# Patient Record
Sex: MALE | Race: WHITE | NOT HISPANIC OR LATINO | Employment: FULL TIME | URBAN - METROPOLITAN AREA
[De-identification: names, ages, dates, MRNs, and addresses within clinical notes are randomized per-mention and may not be internally consistent; named-entity substitution may affect disease eponyms.]

---

## 2023-02-25 PROBLEM — I71.20 THORACIC AORTIC ANEURYSM WITHOUT RUPTURE (CMS-HCC): Status: ACTIVE | Noted: 2023-02-25

## 2023-02-25 PROBLEM — R53.81 MALAISE AND FATIGUE: Status: ACTIVE | Noted: 2023-02-25

## 2023-02-25 PROBLEM — R53.83 MALAISE AND FATIGUE: Status: ACTIVE | Noted: 2023-02-25

## 2023-02-25 PROBLEM — B02.29 POSTHERPETIC NEURALGIA: Status: ACTIVE | Noted: 2023-02-25

## 2023-02-25 PROBLEM — T75.3XXA MOTION SICKNESS: Status: ACTIVE | Noted: 2023-02-25

## 2023-02-25 PROBLEM — R51.9 HEADACHE DISORDER: Status: ACTIVE | Noted: 2023-02-25

## 2023-02-25 PROBLEM — M54.2 NECK PAIN: Status: ACTIVE | Noted: 2023-02-25

## 2023-02-25 PROBLEM — Q23.1 BICUSPID AORTIC VALVE (HHS-HCC): Status: ACTIVE | Noted: 2023-02-25

## 2023-02-25 PROBLEM — M54.81 OCCIPITAL NEURALGIA OF LEFT SIDE: Status: ACTIVE | Noted: 2023-02-25

## 2023-02-25 PROBLEM — B02.9 SHINGLES RASH: Status: ACTIVE | Noted: 2023-02-25

## 2023-02-25 PROBLEM — F41.9 ANXIETY: Status: ACTIVE | Noted: 2023-02-25

## 2023-02-25 PROBLEM — Q23.81 BICUSPID AORTIC VALVE: Status: ACTIVE | Noted: 2023-02-25

## 2023-02-25 RX ORDER — GABAPENTIN 400 MG/1
3 CAPSULE ORAL 3 TIMES DAILY
COMMUNITY
Start: 2016-11-11 | End: 2023-12-11 | Stop reason: SDUPTHER

## 2023-02-25 RX ORDER — NALOXONE HYDROCHLORIDE 4 MG/.1ML
SPRAY NASAL
COMMUNITY
Start: 2020-03-11 | End: 2024-01-14 | Stop reason: WASHOUT

## 2023-02-25 RX ORDER — MORPHINE SULFATE 15 MG/1
1 TABLET, FILM COATED, EXTENDED RELEASE ORAL 2 TIMES DAILY PRN
COMMUNITY
Start: 2022-10-14 | End: 2024-03-21 | Stop reason: SDUPTHER

## 2023-03-23 ENCOUNTER — OFFICE VISIT (OUTPATIENT)
Dept: PRIMARY CARE | Facility: CLINIC | Age: 53
End: 2023-03-23
Payer: COMMERCIAL

## 2023-03-23 VITALS
DIASTOLIC BLOOD PRESSURE: 75 MMHG | HEIGHT: 75 IN | HEART RATE: 57 BPM | SYSTOLIC BLOOD PRESSURE: 115 MMHG | OXYGEN SATURATION: 96 % | BODY MASS INDEX: 25.24 KG/M2 | TEMPERATURE: 97.5 F | WEIGHT: 203 LBS

## 2023-03-23 DIAGNOSIS — B02.29 POSTHERPETIC NEURALGIA: ICD-10-CM

## 2023-03-23 DIAGNOSIS — Z00.00 WELLNESS EXAMINATION: ICD-10-CM

## 2023-03-23 DIAGNOSIS — Q23.1 BICUSPID AORTIC VALVE (HHS-HCC): ICD-10-CM

## 2023-03-23 DIAGNOSIS — M54.81 OCCIPITAL NEURALGIA OF LEFT SIDE: Primary | ICD-10-CM

## 2023-03-23 PROCEDURE — 99396 PREV VISIT EST AGE 40-64: CPT | Performed by: FAMILY MEDICINE

## 2023-03-23 ASSESSMENT — PATIENT HEALTH QUESTIONNAIRE - PHQ9
1. LITTLE INTEREST OR PLEASURE IN DOING THINGS: NOT AT ALL
SUM OF ALL RESPONSES TO PHQ9 QUESTIONS 1 AND 2: 0
2. FEELING DOWN, DEPRESSED OR HOPELESS: NOT AT ALL

## 2023-03-23 NOTE — PROGRESS NOTES
"Subjective   Patient ID: Sebastian Andrews is a 52 y.o. male who presents for Follow-up and Annual Exam (Talk about his meds cause other drKristine For pain meds is leaving. ).    1. Health Maintenance  Overall patient is doing well and presents with no major health concerns today.  Colon Cancer Screening: No family history, will order cologuard   Diet: \"pretty good\"  Exercise:  working on improving it, active on his feet.     2. Postherpetic neuralgia   Seeing Pain Management. Managed with medications at this time. Cuts pain in half.     3. Bicuspid Aortic Valve   Follows cardiology Dr. Conner, obtaining CT next Thursday. Not symptomatic at this time.                Review of Systems   All other systems reviewed and are negative.      Objective   /75   Pulse 57   Temp 36.4 °C (97.5 °F)   Ht 1.905 m (6' 3\")   Wt 92.1 kg (203 lb)   SpO2 96%   BMI 25.37 kg/m²     Physical Exam  Constitutional:       Appearance: Normal appearance. He is normal weight.   HENT:      Head: Normocephalic and atraumatic.      Right Ear: Tympanic membrane and ear canal normal.      Left Ear: Tympanic membrane and ear canal normal.      Mouth/Throat:      Mouth: Mucous membranes are moist.   Eyes:      Extraocular Movements: Extraocular movements intact.      Conjunctiva/sclera: Conjunctivae normal.   Cardiovascular:      Rate and Rhythm: Normal rate and regular rhythm.   Pulmonary:      Breath sounds: Normal breath sounds.   Abdominal:      General: Abdomen is flat. Bowel sounds are normal.      Palpations: Abdomen is soft.   Musculoskeletal:      Cervical back: Normal range of motion and neck supple.   Skin:     General: Skin is warm.   Neurological:      General: No focal deficit present.      Mental Status: He is alert and oriented to person, place, and time.   Psychiatric:         Mood and Affect: Mood normal.         Behavior: Behavior normal.         Assessment/Plan   Problem List Items Addressed This Visit          Nervous    " Occipital neuralgia of left side - Primary    Postherpetic neuralgia     Continue with pain management.             Circulatory    Bicuspid aortic valve     Following Cardiology.            Other    Wellness examination     Routine lab work placed.   Cologuard order placed.

## 2023-10-30 ENCOUNTER — PHARMACY VISIT (OUTPATIENT)
Dept: PHARMACY | Facility: CLINIC | Age: 53
End: 2023-10-30
Payer: COMMERCIAL

## 2023-10-30 PROCEDURE — RXMED WILLOW AMBULATORY MEDICATION CHARGE

## 2023-11-27 ENCOUNTER — PHARMACY VISIT (OUTPATIENT)
Dept: PHARMACY | Facility: CLINIC | Age: 53
End: 2023-11-27
Payer: COMMERCIAL

## 2023-11-27 PROCEDURE — RXMED WILLOW AMBULATORY MEDICATION CHARGE

## 2023-12-11 ENCOUNTER — OFFICE VISIT (OUTPATIENT)
Dept: PAIN MEDICINE | Facility: CLINIC | Age: 53
End: 2023-12-11
Payer: COMMERCIAL

## 2023-12-11 VITALS — SYSTOLIC BLOOD PRESSURE: 118 MMHG | DIASTOLIC BLOOD PRESSURE: 80 MMHG | HEART RATE: 69 BPM | RESPIRATION RATE: 18 BRPM

## 2023-12-11 DIAGNOSIS — B02.29 POSTHERPETIC NEURALGIA: ICD-10-CM

## 2023-12-11 DIAGNOSIS — M54.81 OCCIPITAL NEURALGIA OF LEFT SIDE: Primary | ICD-10-CM

## 2023-12-11 PROCEDURE — 1036F TOBACCO NON-USER: CPT | Performed by: NURSE PRACTITIONER

## 2023-12-11 PROCEDURE — 99214 OFFICE O/P EST MOD 30 MIN: CPT | Performed by: NURSE PRACTITIONER

## 2023-12-11 PROCEDURE — RXMED WILLOW AMBULATORY MEDICATION CHARGE

## 2023-12-11 RX ORDER — GABAPENTIN 400 MG/1
1200 CAPSULE ORAL 3 TIMES DAILY
Qty: 810 CAPSULE | Refills: 2 | Status: SHIPPED | OUTPATIENT
Start: 2023-12-11 | End: 2024-01-14 | Stop reason: WASHOUT

## 2023-12-11 RX ORDER — MORPHINE SULFATE 15 MG/1
15 TABLET, FILM COATED, EXTENDED RELEASE ORAL 2 TIMES DAILY
Qty: 60 TABLET | Refills: 0 | Status: SHIPPED | OUTPATIENT
Start: 2024-01-26 | End: 2024-03-14

## 2023-12-11 RX ORDER — MORPHINE SULFATE 15 MG/1
15 TABLET, FILM COATED, EXTENDED RELEASE ORAL 2 TIMES DAILY PRN
Qty: 60 TABLET | Refills: 0 | Status: SHIPPED | OUTPATIENT
Start: 2023-12-27 | End: 2024-03-21 | Stop reason: SDUPTHER

## 2023-12-11 RX ORDER — MORPHINE SULFATE 15 MG/1
15 TABLET, FILM COATED, EXTENDED RELEASE ORAL 2 TIMES DAILY
Qty: 60 TABLET | Refills: 0 | Status: SHIPPED | OUTPATIENT
Start: 2024-02-25 | End: 2024-02-23 | Stop reason: SDUPTHER

## 2023-12-11 ASSESSMENT — ENCOUNTER SYMPTOMS
ALLERGIC/IMMUNOLOGIC NEGATIVE: 1
NECK PAIN: 0
CONSTITUTIONAL NEGATIVE: 1
JOINT SWELLING: 0
DIZZINESS: 0
BACK PAIN: 0
HEMATOLOGIC/LYMPHATIC NEGATIVE: 1
MYALGIAS: 0
HEADACHES: 1
FACIAL ASYMMETRY: 0
GASTROINTESTINAL NEGATIVE: 1
NUMBNESS: 0
PSYCHIATRIC NEGATIVE: 1
EYES NEGATIVE: 1
ARTHRALGIAS: 0
NECK STIFFNESS: 0
ENDOCRINE NEGATIVE: 1
WEAKNESS: 0
RESPIRATORY NEGATIVE: 1
CARDIOVASCULAR NEGATIVE: 1

## 2023-12-11 ASSESSMENT — PAIN SCALES - GENERAL: PAINLEVEL: 5

## 2023-12-11 NOTE — PROGRESS NOTES
Assessment/Plan   Problem List Items Addressed This Visit             ICD-10-CM    Occipital neuralgia of left side - Primary M54.81    Relevant Medications    morphine CR (MS Contin) 15 mg 12 hr tablet (Start on 12/27/2023)    morphine CR (MS Contin) 15 mg 12 hr tablet (Start on 1/26/2024)    morphine CR (MS Contin) 15 mg 12 hr tablet (Start on 2/25/2024)    gabapentin (Neurontin) 400 mg capsule    Other Relevant Orders    Drug Screen, Urine With Reflex to Confirmation    Postherpetic neuralgia B02.29    Relevant Medications    morphine CR (MS Contin) 15 mg 12 hr tablet (Start on 12/27/2023)    morphine CR (MS Contin) 15 mg 12 hr tablet (Start on 1/26/2024)    morphine CR (MS Contin) 15 mg 12 hr tablet (Start on 2/25/2024)    gabapentin (Neurontin) 400 mg capsule    Other Relevant Orders    Drug Screen, Urine With Reflex to Confirmation     Complete your toxicology at a  facility prior to your next office visit.    Follow-up 12 weeks.      Patient ID: Sebastian Andrews is a 53 y.o. male who presents for Med Refill (Postherpetic Neuralgia/Left Eye Occipital Pain  ).  GINA Lino follows up for interval reevaluation of his chronic pain from post herpetic neuralgia that is in a C2, C3 and C4 distribution to the left temporal, left side of the head.      His pain is an intermittent sharp and stabbing pain. It feels like an ice pick stabbing into his left ear. He has increased pain, allodynia especially if he is getting his haircut, or if the wind is blowing or any kind of tactile communication to this area increases this pain. Pain is worse in the morning and it takes a couple of hours to get under control with gabapentin and morphine. Once this is under control he can function very well throughout the day with his full-time job as a . No change in pain since last office visit.        Morphine extended release 15 mg twice daily, gabapentin 3600 mg daily that helps reduce pain and improve function up to  50%. Average pain score is 5 out of 10 with medication. Has an average quality of family and social life with current condition and treatment. Has not been to the ED for pain since last office visit. Toxicology consistent January 3, 2023. Annual controlled substance agreement and opioid risk tool are completed and scanned into the chart April 5, 2023.     For continuity:   Given the patient's report of reduced pain and improved functional ability without adverse effects, it is reasonable to treat with narcotic medications. The terms of the opioid agreement as well as the potential risks and adverse effects of the patient's medication regimen were discussed in detail. This includes if applicable due to dosage of medication permission to discuss and coordinate care with other treatment providers relevant to the patients condition. The patient verbalized understanding.      Risks and side effects of chronic opioid therapy including but not limited to tolerance, dependence, constipation, hyperalgesia, cognitive side effects, addiction and possible death due to overuse and or misuse were discussed. I also discussed that such medications when co-administered with other sedative agents including but not limited to alcohol, benzodiazepines, sedative hypnotics and illegal drugs could pose life threatening consequences including death. I also explained the impact that the administration of such medication has on a patient with obstructive sleep apnea and continued recommendations for use of apnea devices if ordered are prescribed by other physicians. In order to effectively and safely treat the pain, I also emphasized the importance of compliance with the treatment plan, as well as compliance with the terms of the opioid agreement, which was reviewed in detail. I explained the importance of being responsible with the medications and to take these only as prescribed, never in excess and never for reasons other than pain  reduction. The patient was counseled on keeping the medications safe and locked away from children and other adults as well as disposal methods and options. The patient understood the risks and instructions.      I also discussed with the patient in detail that based on the clinical response to the opioid medications and improvements of activities of daily living, sleep, and work performance in light of compliance with the treatment plan we can continue this form of therapy for the above chronic pain. The goal and rationale used for current treatment with chronic opioid medication is to control the pain and alleviate disability induced by the chronic pain condition noted above after failures of other non-opioid and nonpharmacological modalities to treat the chronic pain and the symptoms associated with have failed. The patient understood the goals in terms of the above treatment plan and had no further questions prior to leaving the office today.      Of note, the above-mentioned diagnoses/conditions and expected fluctuating nature of pain, and pain characteristic changes may lead to prolonged functional impairment requiring frequent and multiple reassessments with continued high level medical decision making. As noted, medication and medication management may require opiate therapy in excess of a routine less than 30 day medication requirement. The patient may require daily opiate therapy necessitating month-long prescription medication as noted above in order to perform activities of daily living and achieve acceptable quality of life with respect to their chronic pain condition for the foreseeable future. We monitor our patient's carefully through drug monitoring, medication counts, urine drug testing specific to their medication as well as a myriad of other substances and with frequent follow-ups with interval reassement of the chronic pain condition, its pathophysiology and prognosis.      The level of clinical  decision making at this office visit is high due to high risks and complications including mortality and morbidity related to acute and chronic pain with respects to life, bodily function, and treatment. Risks and clinical decisions with respect to under treatment, failure to maintain adequate treatment, and/or overtreatment complications and outcomes were discussed with the patient with respect to their chronic pain conditions, interventional therapies, as well as the use of various medications including possible controlled/dangerous medications. The amount and complexity of reviewed data at this in subsequent office visits is high given patient's fluctuating clinical presentation, laboratory and radiographic reports, prescription monitoring program data, and medication history as well as other relevant data as noted above. Pertinent negatives and positives data was used in consideration for the above-mentioned high complexity.       Given the patient's total MED, general use of daily opiates, or other coadministered medications in various classes the patient was offered a prescription for Narcan. I instructed the patient that it is important that patient fill this medication in order to demonstrate understanding of the gravity of possible side effects including respiratory depression and risk of overdose of this opiate load or medication combination. As such patient will be required to bring Narcan prescription to follow-up appointments as part of compliance with continued opiate care.      With respect to opiate induced constipation I discussed multiple ways to combat this problem including staying hydrated and taking over-the-counter medications such as Dulcolax, Miralax and Senna. If these treatments are not effective we could consider such medications as Amitiza, Linzess and Movantik.      Disclaimer: This note was transcribed using an audio transcription device. As such, minor errors may be present with  regard to spelling, punctuation, and inadvertent word insertion. Please disregard such errors.    Review of Systems   Constitutional: Negative.    HENT: Negative.     Eyes: Negative.    Respiratory: Negative.     Cardiovascular: Negative.    Gastrointestinal: Negative.    Endocrine: Negative.    Genitourinary: Negative.    Musculoskeletal:  Negative for arthralgias, back pain, gait problem, joint swelling, myalgias, neck pain and neck stiffness.   Skin: Negative.    Allergic/Immunologic: Negative.    Neurological:  Positive for headaches. Negative for dizziness, facial asymmetry, weakness and numbness.   Hematological: Negative.    Psychiatric/Behavioral: Negative.         Physical Exam  Vitals and nursing note reviewed.   Constitutional:       Appearance: Normal appearance.   HENT:      Head: Normocephalic and atraumatic.      Right Ear: External ear normal.      Left Ear: External ear normal.      Nose: Nose normal.   Eyes:      Extraocular Movements: Extraocular movements intact.      Conjunctiva/sclera: Conjunctivae normal.      Pupils: Pupils are equal, round, and reactive to light.   Cardiovascular:      Rate and Rhythm: Normal rate and regular rhythm.      Pulses: Normal pulses.      Comments: Systolic murmur right upper sternal border 2-3 out of 6.  Pulmonary:      Effort: Pulmonary effort is normal.      Breath sounds: Normal breath sounds.   Abdominal:      General: Abdomen is flat. Bowel sounds are normal.      Palpations: Abdomen is soft.   Musculoskeletal:         General: Normal range of motion.      Cervical back: Normal range of motion.   Skin:     General: Skin is warm and dry.      Capillary Refill: Capillary refill takes 2 to 3 seconds.   Neurological:      Mental Status: He is alert and oriented to person, place, and time.      Cranial Nerves: No cranial nerve deficit.      Sensory: No sensory deficit.      Motor: No weakness.      Coordination: Coordination normal.      Gait: Gait normal.       Deep Tendon Reflexes: Reflexes normal.   Psychiatric:         Mood and Affect: Mood normal.         Behavior: Behavior normal.             History reviewed. No pertinent surgical history.      Current Outpatient Medications:     gabapentin (Neurontin) 400 mg capsule, TAKE 3 CAPSULES BY MOUTH THREE TIMES A DAY, Disp: 810 capsule, Rfl: 0    morphine CR (MS Contin) 15 mg 12 hr tablet, TAKE 1 TABLET BY MOUTH TWO TIMES A DAY AS NEEDED, Disp: 60 tablet, Rfl: 0    naloxone (Narcan) 4 mg/0.1 mL nasal spray, USE AS DIRECTED, Disp: 2 each, Rfl: 0    gabapentin (Neurontin) 400 mg capsule, Take 3 capsules (1,200 mg) by mouth 3 times a day., Disp: , Rfl:     morphine CR (MS Contin) 15 mg 12 hr tablet, Take 1 tablet (15 mg) by mouth 2 times a day as needed., Disp: , Rfl:     morphine CR (MS Contin) 15 mg 12 hr tablet, TAKE 1 TABLET BY MOUTH TWO TIMES A DAY AS NEEDED (Patient not taking: Reported on 12/11/2023), Disp: 60 tablet, Rfl: 0    morphine CR (MS Contin) 15 mg 12 hr tablet, TAKE 1 TABLET BY MOUTH TWO TIMES A DAY AS NEEDED (Patient not taking: Reported on 12/11/2023), Disp: 60 tablet, Rfl: 0    morphine CR (MS Contin) 15 mg 12 hr tablet, TAKE 1 TABLET TWICE DAILY PRN (Patient not taking: Reported on 12/11/2023), Disp: 60 tablet, Rfl: 0    morphine CR (MS Contin) 15 mg 12 hr tablet, TAKE 1 TABLET BY MOUTH TWO TIMES A DAY AS NEEDED (Patient not taking: Reported on 12/11/2023), Disp: 60 tablet, Rfl: 0    naloxone (Narcan) 4 mg/0.1 mL nasal spray, Administer into affected nostril(s). Use as directed, Disp: , Rfl:     Allergies   Allergen Reactions    Iodinated Contrast Media Other     nausea    Shellfish Containing Products Unknown    Shellfish Derived Unknown       MRI Cervical without Contrast 31Mar2017 06:21PM Jina Garber   [Apr 6, 2017 3:30PM Delvin Stephen]  AMA Intake Activity Log Entry by Delvin Stephen [email or web address]) on 4/6/2017 3:25 PM  Status Change: To Closed - Confirmed-Appt Past   [Mar 23, 2017 4:23PM Jcarlos  Jina ROJAS  Reason: Unspecified for MRI Cervical without Contrast      Test Name Result Flag Reference   MRI Cervical without Contrast (Report)       Interpreted by: ARELI RENTERIA  04/01/17 14:11  MRN: [id number]  Patient Name: GA LAGOS     STUDY:  MRI CERVICAL WO; 3/31/2017 6:21 pm     INDICATION:  Signs/Symptoms: neck pain. Left-sided neck pain beginning October 2016.     COMPARISON:  None.     ACCESSION NUMBER(S):  [id number]     ORDERING CLINICIAN:  JINA MARIN     TECHNIQUE:  Sagittal T1, T2, axial T1 and axial T2 weighted images were acquired  through the cervical spine.     FINDINGS:  Alignment: The vertebral alignment is within normal limits.     Vertebrae/Intervertebral Discs: The vertebral bodies demonstrate  expected height.The marrow signal is within normal limits. There is  subtle disc space narrowing and endplate spurring at C6/7.     Cord: Normal in caliber and signal.     C1-C2: The cervicomedullary junction appears unremarkable. There is  no central canal stenosis.     C2-C3: There is no posterior disc contour abnormality. There is no  significant central canal or neural foraminal stenosis.     C3-C4: There is no posterior disc contour abnormality. Mild  uncovertebral joint hypertrophy and degenerative facet changes are  identified, left greater than right. There is mild left-sided neural  foraminal stenosis.     C4-C5: There is no posterior disc contour abnormality. There is no  significant central canal or neural foraminal stenosis.     C5-C6: There is mild posterior disc bulging and endplate spurring  with minimal effacement of the ventral subarachnoid space. There is  no significant central canal stenosis. Mild facet and uncovertebral  joint hypertrophy are identified, right greater than left. The neural  foramina remain patent.     C6-C7: There is a central disc protrusion partially effacing the  ventral subarachnoid space. There is also minimal narrowing of the  central canal. Mild  uncovertebral joint hypertrophy is noted, right  greater than left. There is very mild right-sided neural foraminal  stenosis.     C7-T1: There is no posterior disc contour abnormality. There is no  significant central canal or neural foraminal stenosis.     The upper thoracic vertebrae and spinal canal are unremarkable.     The prevertebral and posterior paraspinous soft tissues are within  normal limits.     IMPRESSION:  Very mild degenerative changes of the cervical spine.  Transcribed by: [email or web address], User  Electronically signed by: [email or web address], User  04/01/17 14:11          Active Ambulatory Problems     Diagnosis Date Noted    Anxiety 02/25/2023    Bicuspid aortic valve 02/25/2023    Headache disorder 02/25/2023    Malaise and fatigue 02/25/2023    Motion sickness 02/25/2023    Neck pain 02/25/2023    Occipital neuralgia of left side 02/25/2023    Postherpetic neuralgia 02/25/2023    Shingles rash 02/25/2023    Thoracic aortic aneurysm without rupture (CMS/HCC) 02/25/2023    Wellness examination 03/23/2023     Resolved Ambulatory Problems     Diagnosis Date Noted    No Resolved Ambulatory Problems     Past Medical History:   Diagnosis Date    Encounter for other specified prophylactic measures 01/08/2018    Other pneumothorax     Personal history of other diseases of the circulatory system     Personal history of other diseases of the nervous system and sense organs     Personal history of other infectious and parasitic diseases

## 2023-12-14 ENCOUNTER — PHARMACY VISIT (OUTPATIENT)
Dept: PHARMACY | Facility: CLINIC | Age: 53
End: 2023-12-14
Payer: COMMERCIAL

## 2023-12-18 ENCOUNTER — APPOINTMENT (OUTPATIENT)
Dept: PAIN MEDICINE | Facility: CLINIC | Age: 53
End: 2023-12-18
Payer: COMMERCIAL

## 2023-12-28 ENCOUNTER — PHARMACY VISIT (OUTPATIENT)
Dept: PHARMACY | Facility: CLINIC | Age: 53
End: 2023-12-28
Payer: COMMERCIAL

## 2023-12-28 PROCEDURE — RXMED WILLOW AMBULATORY MEDICATION CHARGE

## 2024-01-12 ENCOUNTER — LAB (OUTPATIENT)
Dept: LAB | Facility: LAB | Age: 54
End: 2024-01-12
Payer: COMMERCIAL

## 2024-01-12 DIAGNOSIS — Z00.00 WELLNESS EXAMINATION: ICD-10-CM

## 2024-01-12 LAB
25(OH)D3 SERPL-MCNC: 24 NG/ML (ref 30–100)
ALBUMIN SERPL BCP-MCNC: 4.1 G/DL (ref 3.4–5)
ALP SERPL-CCNC: 73 U/L (ref 33–120)
ALT SERPL W P-5'-P-CCNC: 15 U/L (ref 10–52)
ANION GAP SERPL CALC-SCNC: 8 MMOL/L (ref 10–20)
AST SERPL W P-5'-P-CCNC: 12 U/L (ref 9–39)
BASOPHILS # BLD AUTO: 0.06 X10*3/UL (ref 0–0.1)
BASOPHILS NFR BLD AUTO: 1.1 %
BILIRUB SERPL-MCNC: 0.7 MG/DL (ref 0–1.2)
BUN SERPL-MCNC: 13 MG/DL (ref 6–23)
CALCIUM SERPL-MCNC: 9.1 MG/DL (ref 8.6–10.3)
CHLORIDE SERPL-SCNC: 106 MMOL/L (ref 98–107)
CHOLEST SERPL-MCNC: 226 MG/DL (ref 0–199)
CHOLESTEROL/HDL RATIO: 4
CO2 SERPL-SCNC: 30 MMOL/L (ref 21–32)
CREAT SERPL-MCNC: 0.92 MG/DL (ref 0.5–1.3)
EGFRCR SERPLBLD CKD-EPI 2021: >90 ML/MIN/1.73M*2
EOSINOPHIL # BLD AUTO: 0.26 X10*3/UL (ref 0–0.7)
EOSINOPHIL NFR BLD AUTO: 4.8 %
ERYTHROCYTE [DISTWIDTH] IN BLOOD BY AUTOMATED COUNT: 12.6 % (ref 11.5–14.5)
GLUCOSE SERPL-MCNC: 83 MG/DL (ref 74–99)
HCT VFR BLD AUTO: 48.5 % (ref 41–52)
HDLC SERPL-MCNC: 55.9 MG/DL
HGB BLD-MCNC: 15.6 G/DL (ref 13.5–17.5)
IMM GRANULOCYTES # BLD AUTO: 0.01 X10*3/UL (ref 0–0.7)
IMM GRANULOCYTES NFR BLD AUTO: 0.2 % (ref 0–0.9)
LDLC SERPL CALC-MCNC: 147 MG/DL
LYMPHOCYTES # BLD AUTO: 1.75 X10*3/UL (ref 1.2–4.8)
LYMPHOCYTES NFR BLD AUTO: 32.3 %
MCH RBC QN AUTO: 29.4 PG (ref 26–34)
MCHC RBC AUTO-ENTMCNC: 32.2 G/DL (ref 32–36)
MCV RBC AUTO: 91 FL (ref 80–100)
MONOCYTES # BLD AUTO: 0.35 X10*3/UL (ref 0.1–1)
MONOCYTES NFR BLD AUTO: 6.5 %
NEUTROPHILS # BLD AUTO: 2.98 X10*3/UL (ref 1.2–7.7)
NEUTROPHILS NFR BLD AUTO: 55.1 %
NON HDL CHOLESTEROL: 170 MG/DL (ref 0–149)
NRBC BLD-RTO: 0 /100 WBCS (ref 0–0)
PLATELET # BLD AUTO: 177 X10*3/UL (ref 150–450)
POTASSIUM SERPL-SCNC: 4.4 MMOL/L (ref 3.5–5.3)
PROT SERPL-MCNC: 6.6 G/DL (ref 6.4–8.2)
PSA SERPL-MCNC: 0.53 NG/ML
RBC # BLD AUTO: 5.31 X10*6/UL (ref 4.5–5.9)
SODIUM SERPL-SCNC: 140 MMOL/L (ref 136–145)
TRIGL SERPL-MCNC: 115 MG/DL (ref 0–149)
TSH SERPL-ACNC: 2.46 MIU/L (ref 0.44–3.98)
VIT B12 SERPL-MCNC: 198 PG/ML (ref 211–911)
VLDL: 23 MG/DL (ref 0–40)
WBC # BLD AUTO: 5.4 X10*3/UL (ref 4.4–11.3)

## 2024-01-12 PROCEDURE — 80053 COMPREHEN METABOLIC PANEL: CPT

## 2024-01-12 PROCEDURE — 36415 COLL VENOUS BLD VENIPUNCTURE: CPT

## 2024-01-12 PROCEDURE — 82607 VITAMIN B-12: CPT

## 2024-01-12 PROCEDURE — 83036 HEMOGLOBIN GLYCOSYLATED A1C: CPT

## 2024-01-12 PROCEDURE — 84153 ASSAY OF PSA TOTAL: CPT

## 2024-01-12 PROCEDURE — 80061 LIPID PANEL: CPT

## 2024-01-12 PROCEDURE — 85025 COMPLETE CBC W/AUTO DIFF WBC: CPT

## 2024-01-12 PROCEDURE — 84443 ASSAY THYROID STIM HORMONE: CPT

## 2024-01-12 PROCEDURE — 82306 VITAMIN D 25 HYDROXY: CPT

## 2024-01-13 LAB
EST. AVERAGE GLUCOSE BLD GHB EST-MCNC: 117 MG/DL
HBA1C MFR BLD: 5.7 %

## 2024-01-15 ENCOUNTER — OFFICE VISIT (OUTPATIENT)
Dept: PRIMARY CARE | Facility: CLINIC | Age: 54
End: 2024-01-15
Payer: COMMERCIAL

## 2024-01-15 VITALS
TEMPERATURE: 97.6 F | HEART RATE: 56 BPM | OXYGEN SATURATION: 96 % | BODY MASS INDEX: 23.62 KG/M2 | DIASTOLIC BLOOD PRESSURE: 68 MMHG | WEIGHT: 190 LBS | HEIGHT: 75 IN | SYSTOLIC BLOOD PRESSURE: 110 MMHG

## 2024-01-15 DIAGNOSIS — Q23.1 BICUSPID AORTIC VALVE (HHS-HCC): ICD-10-CM

## 2024-01-15 DIAGNOSIS — E53.8 VITAMIN B 12 DEFICIENCY: ICD-10-CM

## 2024-01-15 DIAGNOSIS — M54.81 OCCIPITAL NEURALGIA OF LEFT SIDE: ICD-10-CM

## 2024-01-15 DIAGNOSIS — Z23 ENCOUNTER FOR IMMUNIZATION: Primary | ICD-10-CM

## 2024-01-15 DIAGNOSIS — E55.9 VITAMIN D DEFICIENCY: ICD-10-CM

## 2024-01-15 DIAGNOSIS — Z00.00 WELL ADULT HEALTH CHECK: ICD-10-CM

## 2024-01-15 DIAGNOSIS — I71.21 ANEURYSM OF ASCENDING AORTA WITHOUT RUPTURE (CMS-HCC): ICD-10-CM

## 2024-01-15 DIAGNOSIS — B02.29 POSTHERPETIC NEURALGIA: ICD-10-CM

## 2024-01-15 PROCEDURE — 90471 IMMUNIZATION ADMIN: CPT | Performed by: FAMILY MEDICINE

## 2024-01-15 PROCEDURE — RXMED WILLOW AMBULATORY MEDICATION CHARGE

## 2024-01-15 PROCEDURE — 99396 PREV VISIT EST AGE 40-64: CPT | Performed by: FAMILY MEDICINE

## 2024-01-15 PROCEDURE — 90750 HZV VACC RECOMBINANT IM: CPT | Performed by: FAMILY MEDICINE

## 2024-01-15 PROCEDURE — 1036F TOBACCO NON-USER: CPT | Performed by: FAMILY MEDICINE

## 2024-01-15 PROCEDURE — 96372 THER/PROPH/DIAG INJ SC/IM: CPT | Performed by: FAMILY MEDICINE

## 2024-01-15 RX ORDER — ERGOCALCIFEROL 1.25 MG/1
1.25 CAPSULE ORAL
Qty: 12 CAPSULE | Refills: 1 | Status: SHIPPED | OUTPATIENT
Start: 2024-01-15 | End: 2024-07-13

## 2024-01-15 RX ORDER — CYANOCOBALAMIN 1000 UG/ML
1000 INJECTION, SOLUTION INTRAMUSCULAR; SUBCUTANEOUS ONCE
Status: COMPLETED | OUTPATIENT
Start: 2024-01-15 | End: 2024-01-15

## 2024-01-15 RX ADMIN — CYANOCOBALAMIN 1000 MCG: 1000 INJECTION, SOLUTION INTRAMUSCULAR; SUBCUTANEOUS at 09:10

## 2024-01-15 ASSESSMENT — PATIENT HEALTH QUESTIONNAIRE - PHQ9
SUM OF ALL RESPONSES TO PHQ9 QUESTIONS 1 AND 2: 0
1. LITTLE INTEREST OR PLEASURE IN DOING THINGS: NOT AT ALL
2. FEELING DOWN, DEPRESSED OR HOPELESS: NOT AT ALL

## 2024-01-15 NOTE — PROGRESS NOTES
"Subjective   Patient ID: Sebastian Andrews is a 53 y.o. male who presents for Well exam  Needs flu and shingles vaccination    Exercise- active  Diet Eating   Psa Normal  No urinary issues  Needs to do cologuard and no colon cancer in the family      Labs done January 13, 2024  Hemoglobin A1c 5.7  Vitamin D was 24 showing vitamin D deficiency  Vitamin B12 was low at 198  PSA was normal 0.53  TSH was 2.46  Glucose 83, electrolytes normal, BUN 13, creatinine 1.92, liver functions normal  Total cholesterol 226, HDL 55.9, , triglycerides 115  CBC normal    Review of Systems   All other systems reviewed and are negative.      Objective   /68 (BP Location: Right arm, Patient Position: Sitting, BP Cuff Size: Adult)   Pulse 56   Temp 36.4 °C (97.6 °F)   Ht 1.905 m (6' 3\")   Wt 86.2 kg (190 lb)   SpO2 96%   BMI 23.75 kg/m²     General: Patient is alert and oriented ×3 and appears in no acute distress. No respiratory distress.    Head: Atraumatic normocephalic.    Eyes: EOMI, PERRLA      Ears: Canals patent without any irritation, tympanic membranes without inflammation, no swelling, normal light reflex.    Nose: Nares patent. Turbinates are not swollen. No discharge.    Mouth: Normal mucosa. Moist. No erythema, exudates, tonsillar enlargement.    Neck: Normal range of motion, no masses.  Thyroid is palpable and normal in size without any nodules. No anterior cervical or posterior cervical adenopathy.    Heart: Regular rate and rhythm, no clicks or gallops, 3 out of 6 systolic murmur heard best at the right upper sternal border    Lungs: Clear to auscultation bilaterally without any rhonchi rales or wheezing, lung sounds heard throughout all lung fields    Abdomen: Soft, nontender, no rigidity, rebound, guarding or organomegaly. Bowel sounds ×4 quadrants.    Musculoskeletal: Normal range of motion, strength is grossly intact in the proximal distal muscles of the upper and lower extremities bilaterally, deep " tendon reflexes +2 out of 4 and symmetric bilaterally at the patella, Achilles, biceps, triceps, sensation intact.    Nerves: Cranial nerves II through XII appear grossly intact and without deficit    Skin: Intact, dry, no rashes or erythema    Psych: Normal affect.    Assessment/Plan   Problem List Items Addressed This Visit       Bicuspid aortic valve    Occipital neuralgia of left side    Postherpetic neuralgia    Thoracic aortic aneurysm without rupture (CMS/HCC)     Other Visit Diagnoses       Encounter for immunization    -  Primary    Relevant Orders    Zoster vaccine, recombinant, adult (SHINGRIX)    Well adult health check        Vitamin B 12 deficiency        Relevant Medications    cyanocobalamin (Vitamin B-12) injection 1,000 mcg (Start on 1/15/2024  9:15 AM)    Vitamin D deficiency            Postherpetic neuralgia  Left-sided head.  Follow with pain management.  On MS Contin and gabapentin.  - Given Shingrix vaccination today January 15, 2024.  Follow-up in 2 to 6 months for second vaccination    Bicuspid aortic valve and enlarging ascending aorta  - Following with Dr. Conner's cardiology  - Patient did have hyperlipidemia and stated to discuss with Dr. Conner starting statin.    Prostate cancer screening was done January 2024.  Patient has no symptoms.  PSA was normal 0.53    Colon cancer screening needs to be done and patient has Cologuard that he will do at home.  There is no family history of colon cancer.    Patient has vitamin B-12 deficiency  - Started vitamin B12 sublingual  - Given vitamin B12 injection in the office today    Vitamin D deficiency  - Started vitamin D 50,000 IUs weekly with food

## 2024-01-17 ENCOUNTER — PHARMACY VISIT (OUTPATIENT)
Dept: PHARMACY | Facility: CLINIC | Age: 54
End: 2024-01-17
Payer: COMMERCIAL

## 2024-01-26 ENCOUNTER — PHARMACY VISIT (OUTPATIENT)
Dept: PHARMACY | Facility: CLINIC | Age: 54
End: 2024-01-26
Payer: COMMERCIAL

## 2024-01-26 PROCEDURE — RXMED WILLOW AMBULATORY MEDICATION CHARGE

## 2024-02-23 ENCOUNTER — PHARMACY VISIT (OUTPATIENT)
Dept: PHARMACY | Facility: CLINIC | Age: 54
End: 2024-02-23
Payer: COMMERCIAL

## 2024-02-23 DIAGNOSIS — B02.29 POSTHERPETIC NEURALGIA: ICD-10-CM

## 2024-02-23 DIAGNOSIS — M54.81 OCCIPITAL NEURALGIA OF LEFT SIDE: ICD-10-CM

## 2024-02-23 PROCEDURE — RXMED WILLOW AMBULATORY MEDICATION CHARGE

## 2024-02-23 RX ORDER — MORPHINE SULFATE 15 MG/1
15 TABLET, FILM COATED, EXTENDED RELEASE ORAL 2 TIMES DAILY
Qty: 60 TABLET | Refills: 0 | Status: SHIPPED | OUTPATIENT
Start: 2024-02-23 | End: 2024-03-21 | Stop reason: SDUPTHER

## 2024-03-14 ENCOUNTER — OFFICE VISIT (OUTPATIENT)
Dept: CARDIOLOGY | Facility: HOSPITAL | Age: 54
End: 2024-03-14
Payer: COMMERCIAL

## 2024-03-14 VITALS
WEIGHT: 202.82 LBS | SYSTOLIC BLOOD PRESSURE: 120 MMHG | DIASTOLIC BLOOD PRESSURE: 84 MMHG | BODY MASS INDEX: 25.35 KG/M2 | HEART RATE: 64 BPM | OXYGEN SATURATION: 98 %

## 2024-03-14 DIAGNOSIS — Q23.1 BICUSPID AORTIC VALVE (HHS-HCC): Primary | ICD-10-CM

## 2024-03-14 DIAGNOSIS — Q23.1: ICD-10-CM

## 2024-03-14 DIAGNOSIS — E78.5 DYSLIPIDEMIA: ICD-10-CM

## 2024-03-14 PROCEDURE — 93005 ELECTROCARDIOGRAM TRACING: CPT | Performed by: INTERNAL MEDICINE

## 2024-03-14 PROCEDURE — 1036F TOBACCO NON-USER: CPT | Performed by: INTERNAL MEDICINE

## 2024-03-14 PROCEDURE — 99214 OFFICE O/P EST MOD 30 MIN: CPT | Mod: 25 | Performed by: INTERNAL MEDICINE

## 2024-03-14 PROCEDURE — 93010 ELECTROCARDIOGRAM REPORT: CPT | Performed by: INTERNAL MEDICINE

## 2024-03-14 PROCEDURE — 99214 OFFICE O/P EST MOD 30 MIN: CPT | Performed by: INTERNAL MEDICINE

## 2024-03-14 RX ORDER — LANOLIN ALCOHOL/MO/W.PET/CERES
1000 CREAM (GRAM) TOPICAL DAILY
COMMUNITY

## 2024-03-14 NOTE — PROGRESS NOTES
Chief Complaint:   Follow-up     History Of Present Illness:    Sebastian Andrews is a 53 y.o. male presenting for follow-up.  Doing well from a cardiac standpoint.  Denies any chest pain, shortness of breath, dizziness, palpitations.     Last Recorded Vitals:  Vitals:    03/14/24 0919   BP: 120/84   Pulse: 64   SpO2: 98%   Weight: 92 kg (202 lb 13.2 oz)       Past Medical History:  He has a past medical history of Encounter for other specified prophylactic measures (01/08/2018), Other pneumothorax, Personal history of other diseases of the circulatory system, Personal history of other diseases of the nervous system and sense organs, and Personal history of other infectious and parasitic diseases.    Past Surgical History:  He has no past surgical history on file.      Social History:  He reports that he has never smoked. He has never used smokeless tobacco. He reports that he does not currently use alcohol. He reports that he does not use drugs.    Family History:  Family History   Problem Relation Name Age of Onset    Diabetes Mother      Other (cardiovascular disease) Mother      Other (cardiovascular disease) Father      Coronary artery disease Maternal Grandmother      Emphysema Other          Allergies:  Patient has no known allergies.    Outpatient Medications:  Current Outpatient Medications   Medication Instructions    cyanocobalamin (VITAMIN B-12) 1,000 mcg, oral, Daily    gabapentin (Neurontin) 400 mg capsule TAKE 3 CAPSULES BY MOUTH THREE TIMES A DAY    morphine CR (MS Contin) 15 mg 12 hr tablet 1 tablet, oral, 2 times daily PRN    morphine CR (MS Contin) 15 mg 12 hr tablet Take 1 tablet (15 mg) by mouth 2 times a day as needed for severe pain (7 - 10). Do not start before December 27, 2023.    morphine CR (MS Contin) 15 mg 12 hr tablet Take 1 tablet (15 mg) by mouth 2 times a day. Do not crush, chew, or split. Do not start before January 26, 2024.    morphine CR (MS CONTIN) 15 mg, oral, 2 times daily, Do  not crush, chew, or split.    naloxone (Narcan) 4 mg/0.1 mL nasal spray USE AS DIRECTED    Vitamin D2 1,250 mcg, oral, Weekly       Physical Exam:  Constitutional:       Appearance: Healthy appearance. Not in distress.   Neck:      Vascular: No JVR. JVD normal.   Pulmonary:      Effort: Pulmonary effort is normal.      Breath sounds: Normal breath sounds. No wheezing. No rhonchi. No rales.   Chest:      Chest wall: Not tender to palpatation.   Cardiovascular:      Normal rate. Regular rhythm.      Murmurs: There is no murmur.   Edema:     Peripheral edema absent.   Abdominal:      General: Bowel sounds are normal.      Palpations: Abdomen is soft.      Tenderness: There is no abdominal tenderness.   Musculoskeletal: Normal range of motion. Skin:     General: Skin is warm and dry.   Neurological:      General: No focal deficit present.      Mental Status: Alert and oriented to person, place and time.           Last Labs:  CBC -  Lab Results   Component Value Date    WBC 5.4 01/12/2024    HGB 15.6 01/12/2024    HCT 48.5 01/12/2024    MCV 91 01/12/2024     01/12/2024       CMP -  Lab Results   Component Value Date    CALCIUM 9.1 01/12/2024    PROT 6.6 01/12/2024    ALBUMIN 4.1 01/12/2024    AST 12 01/12/2024    ALT 15 01/12/2024    ALKPHOS 73 01/12/2024    BILITOT 0.7 01/12/2024       LIPID PANEL -   Lab Results   Component Value Date    CHOL 226 (H) 01/12/2024    TRIG 115 01/12/2024    HDL 55.9 01/12/2024    CHHDL 4.0 01/12/2024    LDLF 138 (H) 03/03/2020    VLDL 23 01/12/2024    NHDL 170 (H) 01/12/2024       RENAL FUNCTION PANEL -   Lab Results   Component Value Date    GLUCOSE 83 01/12/2024     01/12/2024    K 4.4 01/12/2024     01/12/2024    CO2 30 01/12/2024    ANIONGAP 8 (L) 01/12/2024    BUN 13 01/12/2024    CREATININE 0.92 01/12/2024    CALCIUM 9.1 01/12/2024    ALBUMIN 4.1 01/12/2024        Lab Results   Component Value Date    HGBA1C 5.7 (H) 01/12/2024       Last Cardiology Tests:    ECG  independently reviewed from today: Sinus rhythm, rate 61, no ST or T wave abnormalities    CTA 3/2023:  Annulus  3.2 x 2.4 cm  Root (Sinus of Valsalva)  4.2 x 2.7 cm  Sinotubular junction 3.7 cm  Mid ascending  4.7 x 4.7 cm  Mid descending 2.3 cm      Echo 3/12/2020:  1. The left ventricular systolic function is mildly decreased with a 50%  estimated ejection fraction.  2. Spectral Doppler shows an impaired relaxation pattern of left ventricular  diastolic filling.  3. There is a bicuspid aortic valve. Mild aortic valve stenosis.  4. There is mild dilatation of the ascending aorta (4.2cm).        Assessment/Plan   Very pleasant 53-year-old gentleman with bicuspid aortic valve and moderately dilated aortic root--mid ascending aorta 4.7 cm on CTA in 2023.  Limited bedside echo today shows 4.7 cm dilatation on echo.     Plan:   -CAC Scoring for cardiac risk assessment with borderline lipids and we will be able to assess aortic aneurysmal size as well--Will consider statin therapy if CAC score warrants as well as will determine further surveillance of bicuspid aortic valve and aortopathy pending results           Josep Conner MD

## 2024-03-18 ENCOUNTER — LAB (OUTPATIENT)
Dept: LAB | Facility: LAB | Age: 54
End: 2024-03-18
Payer: COMMERCIAL

## 2024-03-18 DIAGNOSIS — B02.29 POSTHERPETIC NEURALGIA: ICD-10-CM

## 2024-03-18 DIAGNOSIS — M54.81 OCCIPITAL NEURALGIA OF LEFT SIDE: ICD-10-CM

## 2024-03-18 LAB
AMPHETAMINES UR QL SCN: ABNORMAL
BARBITURATES UR QL SCN: ABNORMAL
BENZODIAZ UR QL SCN: ABNORMAL
BZE UR QL SCN: ABNORMAL
CANNABINOIDS UR QL SCN: ABNORMAL
FENTANYL+NORFENTANYL UR QL SCN: ABNORMAL
METHADONE UR QL SCN: ABNORMAL
OPIATES UR QL SCN: ABNORMAL
OXYCODONE+OXYMORPHONE UR QL SCN: ABNORMAL
PCP UR QL SCN: ABNORMAL

## 2024-03-18 PROCEDURE — 80307 DRUG TEST PRSMV CHEM ANLYZR: CPT

## 2024-03-18 PROCEDURE — 80361 OPIATES 1 OR MORE: CPT

## 2024-03-18 PROCEDURE — 80365 DRUG SCREENING OXYCODONE: CPT

## 2024-03-21 ENCOUNTER — OFFICE VISIT (OUTPATIENT)
Dept: PAIN MEDICINE | Facility: CLINIC | Age: 54
End: 2024-03-21
Payer: COMMERCIAL

## 2024-03-21 VITALS
HEART RATE: 58 BPM | DIASTOLIC BLOOD PRESSURE: 77 MMHG | OXYGEN SATURATION: 98 % | RESPIRATION RATE: 17 BRPM | SYSTOLIC BLOOD PRESSURE: 116 MMHG

## 2024-03-21 DIAGNOSIS — M54.81 OCCIPITAL NEURALGIA OF LEFT SIDE: ICD-10-CM

## 2024-03-21 DIAGNOSIS — B02.29 POSTHERPETIC NEURALGIA: Primary | ICD-10-CM

## 2024-03-21 LAB
6MAM UR CFM-MCNC: <25 NG/ML
CODEINE UR CFM-MCNC: <50 NG/ML
HYDROCODONE CTO UR CFM-MCNC: <25 NG/ML
HYDROMORPHONE UR CFM-MCNC: 170 NG/ML
MORPHINE UR CFM-MCNC: >2500 NG/ML
NORHYDROCODONE UR CFM-MCNC: <25 NG/ML
NOROXYCODONE UR CFM-MCNC: <25 NG/ML
OXYCODONE UR CFM-MCNC: <25 NG/ML
OXYMORPHONE UR CFM-MCNC: <25 NG/ML

## 2024-03-21 PROCEDURE — 1036F TOBACCO NON-USER: CPT | Performed by: NURSE PRACTITIONER

## 2024-03-21 PROCEDURE — RXMED WILLOW AMBULATORY MEDICATION CHARGE

## 2024-03-21 PROCEDURE — 99213 OFFICE O/P EST LOW 20 MIN: CPT | Performed by: NURSE PRACTITIONER

## 2024-03-21 RX ORDER — MORPHINE SULFATE 15 MG/1
15 TABLET, FILM COATED, EXTENDED RELEASE ORAL 2 TIMES DAILY
Qty: 60 TABLET | Refills: 0 | Status: SHIPPED | OUTPATIENT
Start: 2024-03-24 | End: 2024-04-24

## 2024-03-21 RX ORDER — GABAPENTIN 400 MG/1
1200 CAPSULE ORAL 3 TIMES DAILY
Qty: 810 CAPSULE | Refills: 1 | Status: SHIPPED | OUTPATIENT
Start: 2024-03-21 | End: 2024-09-17

## 2024-03-21 RX ORDER — MORPHINE SULFATE 15 MG/1
15 TABLET, FILM COATED, EXTENDED RELEASE ORAL 2 TIMES DAILY PRN
Qty: 60 TABLET | Refills: 0 | Status: SHIPPED | OUTPATIENT
Start: 2024-04-23 | End: 2024-05-23

## 2024-03-21 RX ORDER — MORPHINE SULFATE 15 MG/1
15 TABLET, FILM COATED, EXTENDED RELEASE ORAL 2 TIMES DAILY
Qty: 60 TABLET | Refills: 0 | Status: SHIPPED | OUTPATIENT
Start: 2024-05-23 | End: 2024-06-22

## 2024-03-21 ASSESSMENT — ENCOUNTER SYMPTOMS
PAIN: 1
ENDOCRINE NEGATIVE: 1
CONSTITUTIONAL NEGATIVE: 1
NECK PAIN: 0
BACK PAIN: 0
MYALGIAS: 0
FACIAL ASYMMETRY: 0
EYES NEGATIVE: 1
HEMATOLOGIC/LYMPHATIC NEGATIVE: 1
NECK STIFFNESS: 0
CARDIOVASCULAR NEGATIVE: 1
NUMBNESS: 0
GASTROINTESTINAL NEGATIVE: 1
ALLERGIC/IMMUNOLOGIC NEGATIVE: 1
DIZZINESS: 0
JOINT SWELLING: 0
HEADACHES: 1
PSYCHIATRIC NEGATIVE: 1
RESPIRATORY NEGATIVE: 1
ARTHRALGIAS: 0
WEAKNESS: 0

## 2024-03-21 ASSESSMENT — PAIN SCALES - GENERAL: PAINLEVEL: 5

## 2024-03-21 NOTE — PROGRESS NOTES
Assessment/Plan   Problem List Items Addressed This Visit             ICD-10-CM    Occipital neuralgia of left side M54.81    Postherpetic neuralgia B02.29       Follow-up 12 weeks.      Patient ID: Sebastian Andrews is a 53 y.o. male who presents for Med Refill and Pain (Postherpetic Neuralgia/Head/Eye ).  Med Refill  Associated symptoms include headaches. Pertinent negatives include no arthralgias, joint swelling, myalgias, neck pain, numbness or weakness.   Pain  Associated symptoms include headaches. Pertinent negatives include no joint swelling or weakness.       [facility]land follows up for interval reevaluation of his chronic pain from post herpetic neuralgia that is in a C2, C3 and C4 distribution to the left temporal, left side of the head.      His pain is an intermittent sharp and stabbing pain. It feels like an ice pick stabbing into his left ear. He has increased pain, allodynia especially if he is getting his haircut, or if the wind is blowing or any kind of tactile communication to this area increases this pain. Pain is worse in the morning and it takes a couple of hours to get under control with gabapentin and morphine. Once this is under control he can function very well throughout the day with his full-time job as a . No change in pain since last office visit.        Morphine extended release 15 mg twice daily, gabapentin 3600 mg daily that helps reduce pain and improve function up to 50%. Average pain score is 5 out of 10 with medication. Has an average quality of family and social life with current condition and treatment. Has not been to the ED for pain since last office visit. Toxicology consistent March 18, 2024.  Annual controlled substance agreement and opioid risk tool are completed and scanned into the chart March 21, 2024.     For continuity:   Given the patient's report of reduced pain and improved functional ability without adverse effects, it is reasonable to treat with narcotic  medications. The terms of the opioid agreement as well as the potential risks and adverse effects of the patient's medication regimen were discussed in detail. This includes if applicable due to dosage of medication permission to discuss and coordinate care with other treatment providers relevant to the patients condition. The patient verbalized understanding.      Risks and side effects of chronic opioid therapy including but not limited to tolerance, dependence, constipation, hyperalgesia, cognitive side effects, addiction and possible death due to overuse and or misuse were discussed. I also discussed that such medications when co-administered with other sedative agents including but not limited to alcohol, benzodiazepines, sedative hypnotics and illegal drugs could pose life threatening consequences including death. I also explained the impact that the administration of such medication has on a patient with obstructive sleep apnea and continued recommendations for use of apnea devices if ordered are prescribed by other physicians. In order to effectively and safely treat the pain, I also emphasized the importance of compliance with the treatment plan, as well as compliance with the terms of the opioid agreement, which was reviewed in detail. I explained the importance of being responsible with the medications and to take these only as prescribed, never in excess and never for reasons other than pain reduction. The patient was counseled on keeping the medications safe and locked away from children and other adults as well as disposal methods and options. The patient understood the risks and instructions.      I also discussed with the patient in detail that based on the clinical response to the opioid medications and improvements of activities of daily living, sleep, and work performance in light of compliance with the treatment plan we can continue this form of therapy for the above chronic pain. The goal and  rationale used for current treatment with chronic opioid medication is to control the pain and alleviate disability induced by the chronic pain condition noted above after failures of other non-opioid and nonpharmacological modalities to treat the chronic pain and the symptoms associated with have failed. The patient understood the goals in terms of the above treatment plan and had no further questions prior to leaving the office today.      Of note, the above-mentioned diagnoses/conditions and expected fluctuating nature of pain, and pain characteristic changes may lead to prolonged functional impairment requiring frequent and multiple reassessments with continued high level medical decision making. As noted, medication and medication management may require opiate therapy in excess of a routine less than 30 day medication requirement. The patient may require daily opiate therapy necessitating month-long prescription medication as noted above in order to perform activities of daily living and achieve acceptable quality of life with respect to their chronic pain condition for the foreseeable future. We monitor our patient's carefully through drug monitoring, medication counts, urine drug testing specific to their medication as well as a myriad of other substances and with frequent follow-ups with interval reassement of the chronic pain condition, its pathophysiology and prognosis.      The level of clinical decision making at this office visit is high due to high risks and complications including mortality and morbidity related to acute and chronic pain with respects to life, bodily function, and treatment. Risks and clinical decisions with respect to under treatment, failure to maintain adequate treatment, and/or overtreatment complications and outcomes were discussed with the patient with respect to their chronic pain conditions, interventional therapies, as well as the use of various medications including possible  controlled/dangerous medications. The amount and complexity of reviewed data at this in subsequent office visits is high given patient's fluctuating clinical presentation, laboratory and radiographic reports, prescription monitoring program data, and medication history as well as other relevant data as noted above. Pertinent negatives and positives data was used in consideration for the above-mentioned high complexity.       Given the patient's total MED, general use of daily opiates, or other coadministered medications in various classes the patient was offered a prescription for Narcan. I instructed the patient that it is important that patient fill this medication in order to demonstrate understanding of the gravity of possible side effects including respiratory depression and risk of overdose of this opiate load or medication combination. As such patient will be required to bring Narcan prescription to follow-up appointments as part of compliance with continued opiate care.      With respect to opiate induced constipation I discussed multiple ways to combat this problem including staying hydrated and taking over-the-counter medications such as Dulcolax, Miralax and Senna. If these treatments are not effective we could consider such medications as Amitiza, Linzess and Movantik.      Disclaimer: This note was transcribed using an audio transcription device. As such, minor errors may be present with regard to spelling, punctuation, and inadvertent word insertion. Please disregard such errors.    Review of Systems   Constitutional: Negative.    HENT: Negative.     Eyes: Negative.    Respiratory: Negative.     Cardiovascular: Negative.    Gastrointestinal: Negative.    Endocrine: Negative.    Genitourinary: Negative.    Musculoskeletal:  Negative for arthralgias, back pain, gait problem, joint swelling, myalgias, neck pain and neck stiffness.   Skin: Negative.    Allergic/Immunologic: Negative.    Neurological:   Positive for headaches. Negative for dizziness, facial asymmetry, weakness and numbness.   Hematological: Negative.    Psychiatric/Behavioral: Negative.         Physical Exam  Vitals and nursing note reviewed.   Constitutional:       Appearance: Normal appearance.   HENT:      Head: Normocephalic and atraumatic.      Right Ear: External ear normal.      Left Ear: External ear normal.      Nose: Nose normal.   Eyes:      Extraocular Movements: Extraocular movements intact.      Conjunctiva/sclera: Conjunctivae normal.      Pupils: Pupils are equal, round, and reactive to light.   Cardiovascular:      Rate and Rhythm: Normal rate and regular rhythm.      Pulses: Normal pulses.      Comments: Systolic murmur right upper sternal border 2-3 out of 6.  Pulmonary:      Effort: Pulmonary effort is normal.      Breath sounds: Normal breath sounds.   Abdominal:      General: Abdomen is flat. Bowel sounds are normal.      Palpations: Abdomen is soft.   Musculoskeletal:         General: Normal range of motion.      Cervical back: Normal range of motion.   Skin:     General: Skin is warm and dry.      Capillary Refill: Capillary refill takes 2 to 3 seconds.   Neurological:      Mental Status: He is alert and oriented to person, place, and time.      Cranial Nerves: No cranial nerve deficit.      Sensory: No sensory deficit.      Motor: No weakness.      Coordination: Coordination normal.      Gait: Gait normal.      Deep Tendon Reflexes: Reflexes normal.   Psychiatric:         Mood and Affect: Mood normal.         Behavior: Behavior normal.           No past surgical history on file.      Current Outpatient Medications:     cyanocobalamin (Vitamin B-12) 1,000 mcg tablet, Take 1 tablet (1,000 mcg) by mouth once daily., Disp: , Rfl:     ergocalciferol (Vitamin D-2) 1.25 MG (45833 UT) capsule, Take 1 capsule (1,250 mcg) by mouth 1 (one) time per week., Disp: 12 capsule, Rfl: 1    gabapentin (Neurontin) 400 mg capsule, TAKE 3  CAPSULES BY MOUTH THREE TIMES A DAY, Disp: 810 capsule, Rfl: 0    morphine CR (MS Contin) 15 mg 12 hr tablet, Take 1 tablet (15 mg) by mouth 2 times a day as needed., Disp: , Rfl:     morphine CR (MS Contin) 15 mg 12 hr tablet, Take 1 tablet (15 mg) by mouth 2 times a day as needed for severe pain (7 - 10). Do not start before December 27, 2023., Disp: 60 tablet, Rfl: 0    morphine CR (MS Contin) 15 mg 12 hr tablet, Take 1 tablet (15 mg) by mouth 2 times a day. Do not crush, chew, or split. Do not start before January 26, 2024., Disp: 60 tablet, Rfl: 0    morphine CR (MS Contin) 15 mg 12 hr tablet, Take 1 tablet (15 mg) by mouth 2 times a day. Do not crush, chew, or split., Disp: 60 tablet, Rfl: 0    naloxone (Narcan) 4 mg/0.1 mL nasal spray, USE AS DIRECTED, Disp: 2 each, Rfl: 0    No Known Allergies      MRI Cervical without Contrast 31Mar2017 06:21PM Jina Garber   [Apr 6, 2017 3:30PM Delvin Stephen]  [facility] Intake Activity Log Entry by Delvin Stephne ([email or web address]) on 4/6/2017 3:25 PM  Status Change: To Closed - Confirmed-Appt Past   [Mar 23, 2017 4:23PM Jina Garber]  Reason: Unspecified for MRI Cervical without Contrast      Test Name Result Flag Reference   MRI Cervical without Contrast (Report)       Interpreted by: ARELI RENTERIA  04/01/17 14:11  MRN: [id number]  Patient Name: GA LAGOS     STUDY:  MRI CERVICAL WO; 3/31/2017 6:21 pm     INDICATION:  Signs/Symptoms: neck pain. Left-sided neck pain beginning October 2016.     COMPARISON:  None.     ACCESSION NUMBER(S):  [id number]     ORDERING CLINICIAN:  JINA GARBER     TECHNIQUE:  Sagittal T1, T2, axial T1 and axial T2 weighted images were acquired  through the cervical spine.     FINDINGS:  Alignment: The vertebral alignment is within normal limits.     Vertebrae/Intervertebral Discs: The vertebral bodies demonstrate  expected height.The marrow signal is within normal limits. There is  subtle disc space narrowing and endplate spurring at  C6/7.     Cord: Normal in caliber and signal.     C1-C2: The cervicomedullary junction appears unremarkable. There is  no central canal stenosis.     C2-C3: There is no posterior disc contour abnormality. There is no  significant central canal or neural foraminal stenosis.     C3-C4: There is no posterior disc contour abnormality. Mild  uncovertebral joint hypertrophy and degenerative facet changes are  identified, left greater than right. There is mild left-sided neural  foraminal stenosis.     C4-C5: There is no posterior disc contour abnormality. There is no  significant central canal or neural foraminal stenosis.     C5-C6: There is mild posterior disc bulging and endplate spurring  with minimal effacement of the ventral subarachnoid space. There is  no significant central canal stenosis. Mild facet and uncovertebral  joint hypertrophy are identified, right greater than left. The neural  foramina remain patent.     C6-C7: There is a central disc protrusion partially effacing the  ventral subarachnoid space. There is also minimal narrowing of the  central canal. Mild uncovertebral joint hypertrophy is noted, right  greater than left. There is very mild right-sided neural foraminal  stenosis.     C7-T1: There is no posterior disc contour abnormality. There is no  significant central canal or neural foraminal stenosis.     The upper thoracic vertebrae and spinal canal are unremarkable.     The prevertebral and posterior paraspinous soft tissues are within  normal limits.     IMPRESSION:  Very mild degenerative changes of the cervical spine.  Transcribed by: [email or web address], User  Electronically signed by: [email or web address], User  04/01/17 14:11          Active Ambulatory Problems     Diagnosis Date Noted    Anxiety 02/25/2023    Bicuspid aortic valve 02/25/2023    Headache disorder 02/25/2023    Malaise and fatigue 02/25/2023    Motion sickness 02/25/2023    Neck pain 02/25/2023    Occipital neuralgia of left side  02/25/2023    Postherpetic neuralgia 02/25/2023    Shingles rash 02/25/2023    Thoracic aortic aneurysm without rupture (CMS/HCC) 02/25/2023    Wellness examination 03/23/2023     Resolved Ambulatory Problems     Diagnosis Date Noted    No Resolved Ambulatory Problems     Past Medical History:   Diagnosis Date    Encounter for other specified prophylactic measures 01/08/2018    Other pneumothorax     Personal history of other diseases of the circulatory system     Personal history of other diseases of the nervous system and sense organs     Personal history of other infectious and parasitic diseases

## 2024-03-22 ENCOUNTER — PHARMACY VISIT (OUTPATIENT)
Dept: PHARMACY | Facility: CLINIC | Age: 54
End: 2024-03-22
Payer: COMMERCIAL

## 2024-03-25 ENCOUNTER — PHARMACY VISIT (OUTPATIENT)
Dept: PHARMACY | Facility: CLINIC | Age: 54
End: 2024-03-25
Payer: COMMERCIAL

## 2024-03-25 PROCEDURE — RXMED WILLOW AMBULATORY MEDICATION CHARGE

## 2024-04-13 LAB
ATRIAL RATE: 61 BPM
P AXIS: 56 DEGREES
P OFFSET: 205 MS
P ONSET: 136 MS
PR INTERVAL: 160 MS
Q ONSET: 216 MS
QRS COUNT: 10 BEATS
QRS DURATION: 94 MS
QT INTERVAL: 432 MS
QTC CALCULATION(BAZETT): 434 MS
QTC FREDERICIA: 434 MS
R AXIS: 57 DEGREES
T AXIS: 45 DEGREES
T OFFSET: 432 MS
VENTRICULAR RATE: 61 BPM

## 2024-04-23 ENCOUNTER — PHARMACY VISIT (OUTPATIENT)
Dept: PHARMACY | Facility: CLINIC | Age: 54
End: 2024-04-23
Payer: COMMERCIAL

## 2024-04-23 PROCEDURE — RXMED WILLOW AMBULATORY MEDICATION CHARGE

## 2024-05-23 ENCOUNTER — PHARMACY VISIT (OUTPATIENT)
Dept: PHARMACY | Facility: CLINIC | Age: 54
End: 2024-05-23
Payer: COMMERCIAL

## 2024-05-23 PROCEDURE — RXMED WILLOW AMBULATORY MEDICATION CHARGE

## 2024-05-29 ENCOUNTER — HOSPITAL ENCOUNTER (OUTPATIENT)
Dept: RADIOLOGY | Facility: HOSPITAL | Age: 54
Discharge: HOME | End: 2024-05-29
Payer: COMMERCIAL

## 2024-05-29 DIAGNOSIS — Q23.1 BICUSPID AORTIC VALVE (HHS-HCC): ICD-10-CM

## 2024-05-29 DIAGNOSIS — E78.5 DYSLIPIDEMIA: ICD-10-CM

## 2024-05-29 PROCEDURE — 75571 CT HRT W/O DYE W/CA TEST: CPT

## 2024-06-20 ENCOUNTER — APPOINTMENT (OUTPATIENT)
Dept: PAIN MEDICINE | Facility: CLINIC | Age: 54
End: 2024-06-20
Payer: COMMERCIAL

## 2024-06-20 VITALS — OXYGEN SATURATION: 97 % | HEART RATE: 80 BPM | DIASTOLIC BLOOD PRESSURE: 69 MMHG | SYSTOLIC BLOOD PRESSURE: 115 MMHG

## 2024-06-20 DIAGNOSIS — M54.81 OCCIPITAL NEURALGIA OF LEFT SIDE: Primary | ICD-10-CM

## 2024-06-20 DIAGNOSIS — B02.29 POSTHERPETIC NEURALGIA: ICD-10-CM

## 2024-06-20 PROCEDURE — 99213 OFFICE O/P EST LOW 20 MIN: CPT | Performed by: NURSE PRACTITIONER

## 2024-06-20 PROCEDURE — RXMED WILLOW AMBULATORY MEDICATION CHARGE

## 2024-06-20 PROCEDURE — 1036F TOBACCO NON-USER: CPT | Performed by: NURSE PRACTITIONER

## 2024-06-20 RX ORDER — GABAPENTIN 400 MG/1
1200 CAPSULE ORAL 3 TIMES DAILY
Qty: 810 CAPSULE | Refills: 3 | Status: SHIPPED | OUTPATIENT
Start: 2024-06-20 | End: 2025-06-20

## 2024-06-20 RX ORDER — MORPHINE SULFATE 15 MG/1
15 TABLET, FILM COATED, EXTENDED RELEASE ORAL 2 TIMES DAILY
Qty: 60 TABLET | Refills: 0 | Status: SHIPPED | OUTPATIENT
Start: 2024-08-21 | End: 2024-09-20

## 2024-06-20 RX ORDER — MORPHINE SULFATE 15 MG/1
15 TABLET, FILM COATED, EXTENDED RELEASE ORAL 2 TIMES DAILY
Qty: 60 TABLET | Refills: 0 | Status: SHIPPED | OUTPATIENT
Start: 2024-06-21 | End: 2024-07-21

## 2024-06-20 RX ORDER — MORPHINE SULFATE 15 MG/1
15 TABLET, FILM COATED, EXTENDED RELEASE ORAL 2 TIMES DAILY
Qty: 60 TABLET | Refills: 0 | Status: SHIPPED | OUTPATIENT
Start: 2024-07-22 | End: 2024-08-21

## 2024-06-20 ASSESSMENT — PAIN - FUNCTIONAL ASSESSMENT: PAIN_FUNCTIONAL_ASSESSMENT: 0-10

## 2024-06-20 ASSESSMENT — ENCOUNTER SYMPTOMS
NUMBNESS: 0
PAIN: 1
NECK PAIN: 0
ALLERGIC/IMMUNOLOGIC NEGATIVE: 1
NECK STIFFNESS: 0
GASTROINTESTINAL NEGATIVE: 1
HEADACHES: 1
BACK PAIN: 0
RESPIRATORY NEGATIVE: 1
FACIAL ASYMMETRY: 0
EYES NEGATIVE: 1
MYALGIAS: 0
HEMATOLOGIC/LYMPHATIC NEGATIVE: 1
JOINT SWELLING: 0
WEAKNESS: 0
CONSTITUTIONAL NEGATIVE: 1
ENDOCRINE NEGATIVE: 1
CARDIOVASCULAR NEGATIVE: 1
PSYCHIATRIC NEGATIVE: 1
ARTHRALGIAS: 0
DIZZINESS: 0

## 2024-06-20 ASSESSMENT — PAIN DESCRIPTION - DESCRIPTORS: DESCRIPTORS: SHARP

## 2024-06-20 ASSESSMENT — PAIN SCALES - GENERAL: PAINLEVEL_OUTOF10: 5 - MODERATE PAIN

## 2024-06-20 NOTE — PROGRESS NOTES
Patient ID: Sebastian Andrews is a 54 y.o. male who presents for post herpatic neuralgia.  Med Refill  Associated symptoms include headaches. Pertinent negatives include no arthralgias, joint swelling, myalgias, neck pain, numbness or weakness.   Pain  Associated symptoms include headaches. Pertinent negatives include no joint swelling or weakness.       Holland follows up for interval reevaluation of his chronic pain from post herpetic neuralgia that is in a C2, C3 and C4 distribution to the left temporal, left side of the head.      His pain is an intermittent sharp and stabbing pain. It feels like an ice pick stabbing into his left ear. He has increased pain, allodynia especially if he is getting his haircut, or if the wind is blowing or any kind of tactile communication to this area increases this pain. Pain is worse in the morning and it takes a couple of hours to get under control with gabapentin and morphine. Once this is under control he can function very well throughout the day with his full-time job as a . No change in pain since last office visit.        Morphine extended release 15 mg twice daily, gabapentin 3600 mg daily that helps reduce pain and improve function up to 50%. Average pain score is 5 out of 10 with medication. Has an average quality of family and social life with current condition and treatment. Has not been to the ED for pain since last office visit. Toxicology consistent March 18, 2024.  Annual controlled substance agreement and opioid risk tool are completed and scanned into the chart March 21, 2024.     For continuity:   Given the patient's report of reduced pain and improved functional ability without adverse effects, it is reasonable to treat with narcotic medications. The terms of the opioid agreement as well as the potential risks and adverse effects of the patient's medication regimen were discussed in detail. This includes if applicable due to dosage of medication  permission to discuss and coordinate care with other treatment providers relevant to the patients condition. The patient verbalized understanding.      Risks and side effects of chronic opioid therapy including but not limited to tolerance, dependence, constipation, hyperalgesia, cognitive side effects, addiction and possible death due to overuse and or misuse were discussed. I also discussed that such medications when co-administered with other sedative agents including but not limited to alcohol, benzodiazepines, sedative hypnotics and illegal drugs could pose life threatening consequences including death. I also explained the impact that the administration of such medication has on a patient with obstructive sleep apnea and continued recommendations for use of apnea devices if ordered are prescribed by other physicians. In order to effectively and safely treat the pain, I also emphasized the importance of compliance with the treatment plan, as well as compliance with the terms of the opioid agreement, which was reviewed in detail. I explained the importance of being responsible with the medications and to take these only as prescribed, never in excess and never for reasons other than pain reduction. The patient was counseled on keeping the medications safe and locked away from children and other adults as well as disposal methods and options. The patient understood the risks and instructions.      I also discussed with the patient in detail that based on the clinical response to the opioid medications and improvements of activities of daily living, sleep, and work performance in light of compliance with the treatment plan we can continue this form of therapy for the above chronic pain. The goal and rationale used for current treatment with chronic opioid medication is to control the pain and alleviate disability induced by the chronic pain condition noted above after failures of other non-opioid and  nonpharmacological modalities to treat the chronic pain and the symptoms associated with have failed. The patient understood the goals in terms of the above treatment plan and had no further questions prior to leaving the office today.      Of note, the above-mentioned diagnoses/conditions and expected fluctuating nature of pain, and pain characteristic changes may lead to prolonged functional impairment requiring frequent and multiple reassessments with continued high level medical decision making. As noted, medication and medication management may require opiate therapy in excess of a routine less than 30 day medication requirement. The patient may require daily opiate therapy necessitating month-long prescription medication as noted above in order to perform activities of daily living and achieve acceptable quality of life with respect to their chronic pain condition for the foreseeable future. We monitor our patient's carefully through drug monitoring, medication counts, urine drug testing specific to their medication as well as a myriad of other substances and with frequent follow-ups with interval reassement of the chronic pain condition, its pathophysiology and prognosis.      The level of clinical decision making at this office visit is high due to high risks and complications including mortality and morbidity related to acute and chronic pain with respects to life, bodily function, and treatment. Risks and clinical decisions with respect to under treatment, failure to maintain adequate treatment, and/or overtreatment complications and outcomes were discussed with the patient with respect to their chronic pain conditions, interventional therapies, as well as the use of various medications including possible controlled/dangerous medications. The amount and complexity of reviewed data at this in subsequent office visits is high given patient's fluctuating clinical presentation, laboratory and radiographic  reports, prescription monitoring program data, and medication history as well as other relevant data as noted above. Pertinent negatives and positives data was used in consideration for the above-mentioned high complexity.       Given the patient's total MED, general use of daily opiates, or other coadministered medications in various classes the patient was offered a prescription for Narcan. I instructed the patient that it is important that patient fill this medication in order to demonstrate understanding of the gravity of possible side effects including respiratory depression and risk of overdose of this opiate load or medication combination. As such patient will be required to bring Narcan prescription to follow-up appointments as part of compliance with continued opiate care.      With respect to opiate induced constipation I discussed multiple ways to combat this problem including staying hydrated and taking over-the-counter medications such as Dulcolax, Miralax and Senna. If these treatments are not effective we could consider such medications as Amitiza, Linzess and Movantik.      Disclaimer: This note was transcribed using an audio transcription device. As such, minor errors may be present with regard to spelling, punctuation, and inadvertent word insertion. Please disregard such errors.    Review of Systems   Constitutional: Negative.    HENT: Negative.     Eyes: Negative.    Respiratory: Negative.     Cardiovascular: Negative.    Gastrointestinal: Negative.    Endocrine: Negative.    Genitourinary: Negative.    Musculoskeletal:  Negative for arthralgias, back pain, gait problem, joint swelling, myalgias, neck pain and neck stiffness.   Skin: Negative.    Allergic/Immunologic: Negative.    Neurological:  Positive for headaches. Negative for dizziness, facial asymmetry, weakness and numbness.   Hematological: Negative.    Psychiatric/Behavioral: Negative.         Physical Exam  Vitals and nursing note  reviewed.   Constitutional:       Appearance: Normal appearance.   HENT:      Head: Normocephalic and atraumatic.      Right Ear: External ear normal.      Left Ear: External ear normal.      Nose: Nose normal.   Eyes:      Extraocular Movements: Extraocular movements intact.      Conjunctiva/sclera: Conjunctivae normal.      Pupils: Pupils are equal, round, and reactive to light.   Cardiovascular:      Rate and Rhythm: Normal rate and regular rhythm.      Pulses: Normal pulses.      Comments: Systolic murmur right upper sternal border 2-3 out of 6.  Pulmonary:      Effort: Pulmonary effort is normal.      Breath sounds: Normal breath sounds.   Abdominal:      General: Abdomen is flat. Bowel sounds are normal.      Palpations: Abdomen is soft.   Musculoskeletal:         General: Normal range of motion.      Cervical back: Normal range of motion.   Skin:     General: Skin is warm and dry.      Capillary Refill: Capillary refill takes 2 to 3 seconds.   Neurological:      Mental Status: He is alert and oriented to person, place, and time.      Cranial Nerves: No cranial nerve deficit.      Sensory: No sensory deficit.      Motor: No weakness.      Coordination: Coordination normal.      Gait: Gait normal.      Deep Tendon Reflexes: Reflexes normal.   Psychiatric:         Mood and Affect: Mood normal.         Behavior: Behavior normal.           History reviewed. No pertinent surgical history.      Current Outpatient Medications:     cyanocobalamin (Vitamin B-12) 1,000 mcg tablet, Take 1 tablet (1,000 mcg) by mouth once daily., Disp: , Rfl:     ergocalciferol (Vitamin D-2) 1.25 MG (06780 UT) capsule, Take 1 capsule (1,250 mcg) by mouth 1 (one) time per week., Disp: 12 capsule, Rfl: 1    gabapentin (Neurontin) 400 mg capsule, TAKE 3 CAPSULES BY MOUTH THREE TIMES A DAY, Disp: 810 capsule, Rfl: 1    morphine CR (MS Contin) 15 mg 12 hr tablet, Take 1 tablet (15 mg) by mouth 2 times a day. Do not start before May 23, 2024.,  Disp: 60 tablet, Rfl: 0    naloxone (Narcan) 4 mg/0.1 mL nasal spray, USE AS DIRECTED, Disp: 2 each, Rfl: 0    morphine CR (MS Contin) 15 mg 12 hr tablet, Take 1 tablet (15 mg) by mouth 2 times a day. Do not crush, chew, or split. Do not start before January 26, 2024., Disp: 60 tablet, Rfl: 0    morphine CR (MS Contin) 15 mg 12 hr tablet, Take 1 tablet (15 mg) by mouth 2 times a day. Do not crush, chew, or split. Do not start before March 24, 2024., Disp: 60 tablet, Rfl: 0    morphine CR (MS Contin) 15 mg 12 hr tablet, Take 1 tablet (15 mg) by mouth 2 times a day as needed for severe pain (7 - 10). Do not start before April 23, 2024., Disp: 60 tablet, Rfl: 0    No Known Allergies      MRI Cervical without Contrast 31Mar2017 06:21PM Jina Garber   [Apr 6, 2017 3:30PM Delvin Stephen]  [facility] Intake Activity Log Entry by Delvin Stephen ([email or web address]) on 4/6/2017 3:25 PM  Status Change: To Closed - Confirmed-Appt Past   [Mar 23, 2017 4:23PM Jina Garber]  Reason: Unspecified for MRI Cervical without Contrast      Test Name Result Flag Reference   MRI Cervical without Contrast (Report)       Interpreted by: ARELI RENTERIA  04/01/17 14:11  MRN: [id number]  Patient Name: GA LAGOS     STUDY:  MRI CERVICAL WO; 3/31/2017 6:21 pm     INDICATION:  Signs/Symptoms: neck pain. Left-sided neck pain beginning October 2016.     COMPARISON:  None.     ACCESSION NUMBER(S):  [id number]     ORDERING CLINICIAN:  JINA GARBER     TECHNIQUE:  Sagittal T1, T2, axial T1 and axial T2 weighted images were acquired  through the cervical spine.     FINDINGS:  Alignment: The vertebral alignment is within normal limits.     Vertebrae/Intervertebral Discs: The vertebral bodies demonstrate  expected height.The marrow signal is within normal limits. There is  subtle disc space narrowing and endplate spurring at C6/7.     Cord: Normal in caliber and signal.     C1-C2: The cervicomedullary junction appears unremarkable. There is  no central  canal stenosis.     C2-C3: There is no posterior disc contour abnormality. There is no  significant central canal or neural foraminal stenosis.     C3-C4: There is no posterior disc contour abnormality. Mild  uncovertebral joint hypertrophy and degenerative facet changes are  identified, left greater than right. There is mild left-sided neural  foraminal stenosis.     C4-C5: There is no posterior disc contour abnormality. There is no  significant central canal or neural foraminal stenosis.     C5-C6: There is mild posterior disc bulging and endplate spurring  with minimal effacement of the ventral subarachnoid space. There is  no significant central canal stenosis. Mild facet and uncovertebral  joint hypertrophy are identified, right greater than left. The neural  foramina remain patent.     C6-C7: There is a central disc protrusion partially effacing the  ventral subarachnoid space. There is also minimal narrowing of the  central canal. Mild uncovertebral joint hypertrophy is noted, right  greater than left. There is very mild right-sided neural foraminal  stenosis.     C7-T1: There is no posterior disc contour abnormality. There is no  significant central canal or neural foraminal stenosis.     The upper thoracic vertebrae and spinal canal are unremarkable.     The prevertebral and posterior paraspinous soft tissues are within  normal limits.     IMPRESSION:  Very mild degenerative changes of the cervical spine.  Transcribed by: [email or web address], User  Electronically signed by: [email or web address], User  04/01/17 14:11          Active Ambulatory Problems     Diagnosis Date Noted    Anxiety 02/25/2023    Bicuspid aortic valve ([facility]) 02/25/2023    Headache disorder 02/25/2023    Malaise and fatigue 02/25/2023    Motion sickness 02/25/2023    Neck pain 02/25/2023    Occipital neuralgia of left side 02/25/2023    Postherpetic neuralgia 02/25/2023    Shingles rash 02/25/2023    Thoracic aortic aneurysm without rupture  ([facility]) 02/25/2023    Wellness examination 03/23/2023     Resolved Ambulatory Problems     Diagnosis Date Noted    No Resolved Ambulatory Problems     Past Medical History:   Diagnosis Date    Encounter for other specified prophylactic measures 01/08/2018    Other pneumothorax     Personal history of other diseases of the circulatory system     Personal history of other diseases of the nervous system and sense organs     Personal history of other infectious and parasitic diseases       Sebastian was seen today for post herpatic neuralgia.  Diagnoses and all orders for this visit:  Occipital neuralgia of left side (Primary)  -     morphine CR (MS Contin) 15 mg 12 hr tablet; Take 1 tablet (15 mg) by mouth 2 times a day. Ok to fill one day early due to pharm closed on weekends Do not fill before June 21, 2024.  -     morphine CR (MS Contin) 15 mg 12 hr tablet; Take 1 tablet (15 mg) by mouth 2 times a day. Do not fill before July 22, 2024.  -     morphine CR (MS Contin) 15 mg 12 hr tablet; Take 1 tablet (15 mg) by mouth 2 times a day. Do not fill before August 21, 2024.  -     gabapentin (Neurontin) 400 mg capsule; Take 3 capsules (1,200 mg) by mouth 3 times a day.  Postherpetic neuralgia  -     morphine CR (MS Contin) 15 mg 12 hr tablet; Take 1 tablet (15 mg) by mouth 2 times a day. Ok to fill one day early due to pharm closed on weekends Do not fill before June 21, 2024.  -     morphine CR (MS Contin) 15 mg 12 hr tablet; Take 1 tablet (15 mg) by mouth 2 times a day. Do not fill before July 22, 2024.  -     morphine CR (MS Contin) 15 mg 12 hr tablet; Take 1 tablet (15 mg) by mouth 2 times a day. Do not fill before August 21, 2024.  -     gabapentin (Neurontin) 400 mg capsule; Take 3 capsules (1,200 mg) by mouth 3 times a day.     Follow-up 12 weeks.

## 2024-06-21 ENCOUNTER — PHARMACY VISIT (OUTPATIENT)
Dept: PHARMACY | Facility: CLINIC | Age: 54
End: 2024-06-21
Payer: COMMERCIAL

## 2024-06-21 PROCEDURE — RXMED WILLOW AMBULATORY MEDICATION CHARGE

## 2024-07-15 ENCOUNTER — APPOINTMENT (OUTPATIENT)
Dept: PRIMARY CARE | Facility: CLINIC | Age: 54
End: 2024-07-15
Payer: COMMERCIAL

## 2024-07-15 DIAGNOSIS — Z23 ENCOUNTER FOR IMMUNIZATION: ICD-10-CM

## 2024-07-15 PROCEDURE — 90750 HZV VACC RECOMBINANT IM: CPT | Performed by: FAMILY MEDICINE

## 2024-07-15 PROCEDURE — 90471 IMMUNIZATION ADMIN: CPT | Performed by: FAMILY MEDICINE

## 2024-07-22 ENCOUNTER — PHARMACY VISIT (OUTPATIENT)
Dept: PHARMACY | Facility: CLINIC | Age: 54
End: 2024-07-22
Payer: COMMERCIAL

## 2024-07-22 PROCEDURE — RXMED WILLOW AMBULATORY MEDICATION CHARGE

## 2024-08-21 ENCOUNTER — PHARMACY VISIT (OUTPATIENT)
Dept: PHARMACY | Facility: CLINIC | Age: 54
End: 2024-08-21
Payer: COMMERCIAL

## 2024-08-21 PROCEDURE — RXMED WILLOW AMBULATORY MEDICATION CHARGE

## 2024-09-16 ENCOUNTER — OFFICE VISIT (OUTPATIENT)
Dept: PAIN MEDICINE | Facility: CLINIC | Age: 54
End: 2024-09-16
Payer: COMMERCIAL

## 2024-09-16 VITALS
HEART RATE: 71 BPM | DIASTOLIC BLOOD PRESSURE: 77 MMHG | RESPIRATION RATE: 18 BRPM | OXYGEN SATURATION: 97 % | SYSTOLIC BLOOD PRESSURE: 120 MMHG

## 2024-09-16 DIAGNOSIS — B02.29 POSTHERPETIC NEURALGIA: ICD-10-CM

## 2024-09-16 DIAGNOSIS — M54.81 OCCIPITAL NEURALGIA OF LEFT SIDE: ICD-10-CM

## 2024-09-16 DIAGNOSIS — Z79.891 ENCOUNTER FOR LONG-TERM USE OF OPIATE ANALGESIC: ICD-10-CM

## 2024-09-16 PROCEDURE — 99213 OFFICE O/P EST LOW 20 MIN: CPT | Performed by: NURSE PRACTITIONER

## 2024-09-16 PROCEDURE — 1036F TOBACCO NON-USER: CPT | Performed by: NURSE PRACTITIONER

## 2024-09-16 RX ORDER — NALOXONE HYDROCHLORIDE 4 MG/.1ML
1 SPRAY NASAL AS NEEDED
Qty: 2 EACH | Refills: 0 | Status: SHIPPED | OUTPATIENT
Start: 2024-09-16

## 2024-09-16 RX ORDER — MORPHINE SULFATE 15 MG/1
15 TABLET, FILM COATED, EXTENDED RELEASE ORAL 2 TIMES DAILY
Qty: 60 TABLET | Refills: 0 | Status: SHIPPED | OUTPATIENT
Start: 2024-09-20 | End: 2024-10-20

## 2024-09-16 RX ORDER — MORPHINE SULFATE 15 MG/1
15 TABLET, FILM COATED, EXTENDED RELEASE ORAL 2 TIMES DAILY
Qty: 60 TABLET | Refills: 0 | Status: SHIPPED | OUTPATIENT
Start: 2024-11-19 | End: 2024-12-19

## 2024-09-16 RX ORDER — MORPHINE SULFATE 15 MG/1
15 TABLET, FILM COATED, EXTENDED RELEASE ORAL 2 TIMES DAILY
Qty: 60 TABLET | Refills: 0 | Status: SHIPPED | OUTPATIENT
Start: 2024-10-20 | End: 2024-11-19

## 2024-09-16 ASSESSMENT — ENCOUNTER SYMPTOMS
EYES NEGATIVE: 1
HEADACHES: 1
NECK STIFFNESS: 0
CONSTITUTIONAL NEGATIVE: 1
NUMBNESS: 0
NECK PAIN: 0
FACIAL ASYMMETRY: 0
JOINT SWELLING: 0
HEMATOLOGIC/LYMPHATIC NEGATIVE: 1
DIZZINESS: 0
ALLERGIC/IMMUNOLOGIC NEGATIVE: 1
CARDIOVASCULAR NEGATIVE: 1
WEAKNESS: 0
MYALGIAS: 0
ARTHRALGIAS: 0
RESPIRATORY NEGATIVE: 1
BACK PAIN: 0
PAIN: 1
GASTROINTESTINAL NEGATIVE: 1
ENDOCRINE NEGATIVE: 1
PSYCHIATRIC NEGATIVE: 1

## 2024-09-16 ASSESSMENT — PAIN SCALES - GENERAL: PAINLEVEL: 5

## 2024-09-16 NOTE — PROGRESS NOTES
Patient ID: Sebastian Andrews is a 54 y.o. male who presents for Med Refill and Pain (Post Hepatic Neuralgia ).  Med Refill  Associated symptoms include headaches. Pertinent negatives include no arthralgias, joint swelling, myalgias, neck pain, numbness or weakness.   Pain  Associated symptoms include headaches. Pertinent negatives include no joint swelling or weakness.       Holland follows up for interval reevaluation of his chronic pain from post herpetic neuralgia that is in a C2, C3 and C4 distribution to the left temporal, left side of the head.      His pain is an intermittent sharp and stabbing pain. It feels like an ice pick stabbing into his left ear. He has increased pain, allodynia especially if he is getting his haircut, or if the wind is blowing or any kind of tactile communication to this area increases this pain. Pain is worse in the morning and it takes a couple of hours to get under control with gabapentin and morphine. Once this is under control he can function very well throughout the day with his full-time job as a . No change in pain since last office visit.        Morphine extended release 15 mg twice daily, gabapentin 3600 mg daily that helps reduce pain and improve function up to 50%. Average pain score is 5 out of 10 with medication. Has an average quality of family and social life with current condition and treatment. Has not been to the ED for pain since last office visit. Toxicology consistent March 18, 2024.  Annual controlled substance agreement and opioid risk tool are completed and scanned into the chart March 21, 2024.     For continuity:   Given the patient's report of reduced pain and improved functional ability without adverse effects, it is reasonable to treat with narcotic medications. The terms of the opioid agreement as well as the potential risks and adverse effects of the patient's medication regimen were discussed in detail. This includes if applicable due to dosage  of medication permission to discuss and coordinate care with other treatment providers relevant to the patients condition. The patient verbalized understanding.      Risks and side effects of chronic opioid therapy including but not limited to tolerance, dependence, constipation, hyperalgesia, cognitive side effects, addiction and possible death due to overuse and or misuse were discussed. I also discussed that such medications when co-administered with other sedative agents including but not limited to alcohol, benzodiazepines, sedative hypnotics and illegal drugs could pose life threatening consequences including death. I also explained the impact that the administration of such medication has on a patient with obstructive sleep apnea and continued recommendations for use of apnea devices if ordered are prescribed by other physicians. In order to effectively and safely treat the pain, I also emphasized the importance of compliance with the treatment plan, as well as compliance with the terms of the opioid agreement, which was reviewed in detail. I explained the importance of being responsible with the medications and to take these only as prescribed, never in excess and never for reasons other than pain reduction. The patient was counseled on keeping the medications safe and locked away from children and other adults as well as disposal methods and options. The patient understood the risks and instructions.      I also discussed with the patient in detail that based on the clinical response to the opioid medications and improvements of activities of daily living, sleep, and work performance in light of compliance with the treatment plan we can continue this form of therapy for the above chronic pain. The goal and rationale used for current treatment with chronic opioid medication is to control the pain and alleviate disability induced by the chronic pain condition noted above after failures of other non-opioid and  nonpharmacological modalities to treat the chronic pain and the symptoms associated with have failed. The patient understood the goals in terms of the above treatment plan and had no further questions prior to leaving the office today.      Of note, the above-mentioned diagnoses/conditions and expected fluctuating nature of pain, and pain characteristic changes may lead to prolonged functional impairment requiring frequent and multiple reassessments with continued high level medical decision making. As noted, medication and medication management may require opiate therapy in excess of a routine less than 30 day medication requirement. The patient may require daily opiate therapy necessitating month-long prescription medication as noted above in order to perform activities of daily living and achieve acceptable quality of life with respect to their chronic pain condition for the foreseeable future. We monitor our patient's carefully through drug monitoring, medication counts, urine drug testing specific to their medication as well as a myriad of other substances and with frequent follow-ups with interval reassement of the chronic pain condition, its pathophysiology and prognosis.      The level of clinical decision making at this office visit is high due to high risks and complications including mortality and morbidity related to acute and chronic pain with respects to life, bodily function, and treatment. Risks and clinical decisions with respect to under treatment, failure to maintain adequate treatment, and/or overtreatment complications and outcomes were discussed with the patient with respect to their chronic pain conditions, interventional therapies, as well as the use of various medications including possible controlled/dangerous medications. The amount and complexity of reviewed data at this in subsequent office visits is high given patient's fluctuating clinical presentation, laboratory and radiographic  reports, prescription monitoring program data, and medication history as well as other relevant data as noted above. Pertinent negatives and positives data was used in consideration for the above-mentioned high complexity.       Given the patient's total MED, general use of daily opiates, or other coadministered medications in various classes the patient was offered a prescription for Narcan. I instructed the patient that it is important that patient fill this medication in order to demonstrate understanding of the gravity of possible side effects including respiratory depression and risk of overdose of this opiate load or medication combination. As such patient will be required to bring Narcan prescription to follow-up appointments as part of compliance with continued opiate care.      With respect to opiate induced constipation I discussed multiple ways to combat this problem including staying hydrated and taking over-the-counter medications such as Dulcolax, Miralax and Senna. If these treatments are not effective we could consider such medications as Amitiza, Linzess and Movantik.      Disclaimer: This note was transcribed using an audio transcription device. As such, minor errors may be present with regard to spelling, punctuation, and inadvertent word insertion. Please disregard such errors.    Review of Systems   Constitutional: Negative.    HENT: Negative.     Eyes: Negative.    Respiratory: Negative.     Cardiovascular: Negative.    Gastrointestinal: Negative.    Endocrine: Negative.    Genitourinary: Negative.    Musculoskeletal:  Negative for arthralgias, back pain, gait problem, joint swelling, myalgias, neck pain and neck stiffness.   Skin: Negative.    Allergic/Immunologic: Negative.    Neurological:  Positive for headaches. Negative for dizziness, facial asymmetry, weakness and numbness.   Hematological: Negative.    Psychiatric/Behavioral: Negative.         Physical Exam  Vitals and nursing note  reviewed.   Constitutional:       Appearance: Normal appearance.   HENT:      Head: Normocephalic and atraumatic.      Right Ear: External ear normal.      Left Ear: External ear normal.      Nose: Nose normal.   Eyes:      Extraocular Movements: Extraocular movements intact.      Conjunctiva/sclera: Conjunctivae normal.      Pupils: Pupils are equal, round, and reactive to light.   Cardiovascular:      Rate and Rhythm: Normal rate and regular rhythm.      Pulses: Normal pulses.      Comments: Systolic murmur right upper sternal border 2-3 out of 6.  Pulmonary:      Effort: Pulmonary effort is normal.      Breath sounds: Normal breath sounds.   Abdominal:      General: Abdomen is flat. Bowel sounds are normal.      Palpations: Abdomen is soft.   Musculoskeletal:         General: Normal range of motion.      Cervical back: Normal range of motion.   Skin:     General: Skin is warm and dry.      Capillary Refill: Capillary refill takes 2 to 3 seconds.   Neurological:      Mental Status: He is alert and oriented to person, place, and time.      Cranial Nerves: No cranial nerve deficit.      Sensory: No sensory deficit.      Motor: No weakness.      Coordination: Coordination normal.      Gait: Gait normal.      Deep Tendon Reflexes: Reflexes normal.   Psychiatric:         Mood and Affect: Mood normal.         Behavior: Behavior normal.             History reviewed. No pertinent surgical history.      Current Outpatient Medications:     cyanocobalamin (Vitamin B-12) 1,000 mcg tablet, Take 1 tablet (1,000 mcg) by mouth once daily., Disp: , Rfl:     gabapentin (Neurontin) 400 mg capsule, Take 3 capsules (1,200 mg) by mouth 3 times a day., Disp: 810 capsule, Rfl: 3    morphine CR (MS Contin) 15 mg 12 hr tablet, Take 1 tablet (15 mg) by mouth 2 times a day. Do not start before May 23, 2024., Disp: 60 tablet, Rfl: 0    [START ON 9/20/2024] morphine CR (MS Contin) 15 mg 12 hr tablet, Take 1 tablet (15 mg) by mouth 2 times a  day. Do not fill before September 20, 2024., Disp: 60 tablet, Rfl: 0    [START ON 10/20/2024] morphine CR (MS Contin) 15 mg 12 hr tablet, Take 1 tablet (15 mg) by mouth 2 times a day. Do not fill before October 20, 2024., Disp: 60 tablet, Rfl: 0    [START ON 11/19/2024] morphine CR (MS Contin) 15 mg 12 hr tablet, Take 1 tablet (15 mg) by mouth 2 times a day. Do not fill before November 19, 2024., Disp: 60 tablet, Rfl: 0    naloxone (Narcan) 4 mg/0.1 mL nasal spray, USE AS DIRECTED, Disp: 2 each, Rfl: 0    naloxone (Narcan) 4 mg/0.1 mL nasal spray, Administer 1 spray (4 mg) into affected nostril(s) if needed for opioid reversal or respiratory depression. May repeat every 2-3 minutes if needed, alternating nostrils, until medical assistance becomes available., Disp: 2 each, Rfl: 0    No Known Allergies      MRI Cervical without Contrast 31Mar2017 06:21PM Jina Garber   [Apr 6, 2017 3:30PM Delvin Stephen]  [facility] Intake Activity Log Entry by Delvin Stephen ([email or web address]) on 4/6/2017 3:25 PM  Status Change: To Closed - Confirmed-Appt Past   [Mar 23, 2017 4:23PM Jina Graber]  Reason: Unspecified for MRI Cervical without Contrast      Test Name Result Flag Reference   MRI Cervical without Contrast (Report)       Interpreted by: ARELI RENTERIA  04/01/17 14:11  MRN: [id number]  Patient Name: GA LAGOS     STUDY:  MRI CERVICAL WO; 3/31/2017 6:21 pm     INDICATION:  Signs/Symptoms: neck pain. Left-sided neck pain beginning October 2016.     COMPARISON:  None.     ACCESSION NUMBER(S):  [id number]     ORDERING CLINICIAN:  JINA GARBER     TECHNIQUE:  Sagittal T1, T2, axial T1 and axial T2 weighted images were acquired  through the cervical spine.     FINDINGS:  Alignment: The vertebral alignment is within normal limits.     Vertebrae/Intervertebral Discs: The vertebral bodies demonstrate  expected height.The marrow signal is within normal limits. There is  subtle disc space narrowing and endplate spurring at C6/7.      Cord: Normal in caliber and signal.     C1-C2: The cervicomedullary junction appears unremarkable. There is  no central canal stenosis.     C2-C3: There is no posterior disc contour abnormality. There is no  significant central canal or neural foraminal stenosis.     C3-C4: There is no posterior disc contour abnormality. Mild  uncovertebral joint hypertrophy and degenerative facet changes are  identified, left greater than right. There is mild left-sided neural  foraminal stenosis.     C4-C5: There is no posterior disc contour abnormality. There is no  significant central canal or neural foraminal stenosis.     C5-C6: There is mild posterior disc bulging and endplate spurring  with minimal effacement of the ventral subarachnoid space. There is  no significant central canal stenosis. Mild facet and uncovertebral  joint hypertrophy are identified, right greater than left. The neural  foramina remain patent.     C6-C7: There is a central disc protrusion partially effacing the  ventral subarachnoid space. There is also minimal narrowing of the  central canal. Mild uncovertebral joint hypertrophy is noted, right  greater than left. There is very mild right-sided neural foraminal  stenosis.     C7-T1: There is no posterior disc contour abnormality. There is no  significant central canal or neural foraminal stenosis.     The upper thoracic vertebrae and spinal canal are unremarkable.     The prevertebral and posterior paraspinous soft tissues are within  normal limits.     IMPRESSION:  Very mild degenerative changes of the cervical spine.  Transcribed by: [email or web address], User  Electronically signed by: [email or web address], User  04/01/17 14:11          Active Ambulatory Problems     Diagnosis Date Noted    Anxiety 02/25/2023    Bicuspid aortic valve (HHS-HCC) 02/25/2023    Headache disorder 02/25/2023    Malaise and fatigue 02/25/2023    Motion sickness 02/25/2023    Neck pain 02/25/2023    Occipital neuralgia of left side  02/25/2023    Postherpetic neuralgia 02/25/2023    Shingles rash 02/25/2023    Thoracic aortic aneurysm without rupture (CMS-HCC) 02/25/2023    Wellness examination 03/23/2023     Resolved Ambulatory Problems     Diagnosis Date Noted    No Resolved Ambulatory Problems     Past Medical History:   Diagnosis Date    Encounter for other specified prophylactic measures 01/08/2018    Other pneumothorax     Personal history of other diseases of the circulatory system     Personal history of other diseases of the nervous system and sense organs     Personal history of other infectious and parasitic diseases       Sebastian was seen today for med refill and pain.  Diagnoses and all orders for this visit:  Encounter for long-term use of opiate analgesic  -     naloxone (Narcan) 4 mg/0.1 mL nasal spray; Administer 1 spray (4 mg) into affected nostril(s) if needed for opioid reversal or respiratory depression. May repeat every 2-3 minutes if needed, alternating nostrils, until medical assistance becomes available.  Occipital neuralgia of left side  -     morphine CR (MS Contin) 15 mg 12 hr tablet; Take 1 tablet (15 mg) by mouth 2 times a day. Do not fill before September 20, 2024.  -     morphine CR (MS Contin) 15 mg 12 hr tablet; Take 1 tablet (15 mg) by mouth 2 times a day. Do not fill before October 20, 2024.  -     morphine CR (MS Contin) 15 mg 12 hr tablet; Take 1 tablet (15 mg) by mouth 2 times a day. Do not fill before November 19, 2024.  Postherpetic neuralgia  -     morphine CR (MS Contin) 15 mg 12 hr tablet; Take 1 tablet (15 mg) by mouth 2 times a day. Do not fill before September 20, 2024.  -     morphine CR (MS Contin) 15 mg 12 hr tablet; Take 1 tablet (15 mg) by mouth 2 times a day. Do not fill before October 20, 2024.  -     morphine CR (MS Contin) 15 mg 12 hr tablet; Take 1 tablet (15 mg) by mouth 2 times a day. Do not fill before November 19, 2024.     Follow-up 12 weeks.              Purse String (Simple) Text: Given the location of the defect and the characteristics of the surrounding skin a purse string closure was deemed most appropriate.  Undermining was performed circumfirentially around the surgical defect.  A purse string suture was then placed and tightened.

## 2024-09-20 ENCOUNTER — PHARMACY VISIT (OUTPATIENT)
Dept: PHARMACY | Facility: CLINIC | Age: 54
End: 2024-09-20
Payer: COMMERCIAL

## 2024-09-20 PROCEDURE — RXMED WILLOW AMBULATORY MEDICATION CHARGE

## 2024-10-18 DIAGNOSIS — M54.81 OCCIPITAL NEURALGIA OF LEFT SIDE: ICD-10-CM

## 2024-10-18 DIAGNOSIS — B02.29 POSTHERPETIC NEURALGIA: ICD-10-CM

## 2024-10-18 RX ORDER — MORPHINE SULFATE 15 MG/1
15 TABLET, FILM COATED, EXTENDED RELEASE ORAL 2 TIMES DAILY
Qty: 60 TABLET | Refills: 0 | Status: SHIPPED | OUTPATIENT
Start: 2024-10-20 | End: 2024-11-19

## 2024-11-19 ENCOUNTER — PHARMACY VISIT (OUTPATIENT)
Dept: PHARMACY | Facility: CLINIC | Age: 54
End: 2024-11-19
Payer: COMMERCIAL

## 2024-11-19 PROCEDURE — RXMED WILLOW AMBULATORY MEDICATION CHARGE

## 2024-12-09 ENCOUNTER — OFFICE VISIT (OUTPATIENT)
Dept: PAIN MEDICINE | Facility: CLINIC | Age: 54
End: 2024-12-09
Payer: COMMERCIAL

## 2024-12-09 VITALS
HEART RATE: 76 BPM | DIASTOLIC BLOOD PRESSURE: 77 MMHG | OXYGEN SATURATION: 98 % | RESPIRATION RATE: 17 BRPM | SYSTOLIC BLOOD PRESSURE: 120 MMHG

## 2024-12-09 DIAGNOSIS — B02.29 POSTHERPETIC NEURALGIA: ICD-10-CM

## 2024-12-09 DIAGNOSIS — M54.81 OCCIPITAL NEURALGIA OF LEFT SIDE: Primary | ICD-10-CM

## 2024-12-09 PROCEDURE — 1036F TOBACCO NON-USER: CPT | Performed by: NURSE PRACTITIONER

## 2024-12-09 PROCEDURE — 99213 OFFICE O/P EST LOW 20 MIN: CPT | Performed by: NURSE PRACTITIONER

## 2024-12-09 RX ORDER — MORPHINE SULFATE 15 MG/1
15 TABLET, FILM COATED, EXTENDED RELEASE ORAL 2 TIMES DAILY
Qty: 60 TABLET | Refills: 0 | Status: SHIPPED | OUTPATIENT
Start: 2025-02-17 | End: 2025-03-19

## 2024-12-09 RX ORDER — MORPHINE SULFATE 15 MG/1
15 TABLET, FILM COATED, EXTENDED RELEASE ORAL 2 TIMES DAILY
Qty: 60 TABLET | Refills: 0 | Status: SHIPPED | OUTPATIENT
Start: 2024-12-19 | End: 2025-01-18

## 2024-12-09 RX ORDER — MORPHINE SULFATE 15 MG/1
15 TABLET, FILM COATED, EXTENDED RELEASE ORAL 2 TIMES DAILY
Qty: 60 TABLET | Refills: 0 | Status: SHIPPED | OUTPATIENT
Start: 2025-01-18 | End: 2025-02-17

## 2024-12-09 ASSESSMENT — ENCOUNTER SYMPTOMS
ENDOCRINE NEGATIVE: 1
ARTHRALGIAS: 0
FACIAL ASYMMETRY: 0
DIZZINESS: 0
EYES NEGATIVE: 1
RESPIRATORY NEGATIVE: 1
PSYCHIATRIC NEGATIVE: 1
CONSTITUTIONAL NEGATIVE: 1
NUMBNESS: 0
BACK PAIN: 0
CARDIOVASCULAR NEGATIVE: 1
HEMATOLOGIC/LYMPHATIC NEGATIVE: 1
WEAKNESS: 0
NECK STIFFNESS: 0
GASTROINTESTINAL NEGATIVE: 1
PAIN: 1
JOINT SWELLING: 0
MYALGIAS: 0
NECK PAIN: 0
ALLERGIC/IMMUNOLOGIC NEGATIVE: 1
HEADACHES: 1

## 2024-12-09 ASSESSMENT — PAIN SCALES - GENERAL: PAINLEVEL_OUTOF10: 5

## 2024-12-09 NOTE — PROGRESS NOTES
Patient ID: Sebastian Andrews is a 54 y.o. male who presents for Med Refill and Pain (Occipital Neuralgia/Postherpetic Neuralgia ).  Med Refill  Associated symptoms include headaches. Pertinent negatives include no arthralgias, joint swelling, myalgias, neck pain, numbness or weakness.   Pain  Associated symptoms include headaches. Pertinent negatives include no joint swelling or weakness.       [place] follows up for interval reevaluation of his chronic pain from post herpetic neuralgia that is in a C2, C3 and C4 distribution to the left temporal, left side of the head.      His pain is an intermittent sharp and stabbing pain. It feels like an ice pick stabbing into his left ear. He has increased pain, allodynia especially if he is getting his haircut, or if the wind is blowing or any kind of tactile communication to this area increases this pain. Pain is worse in the morning and it takes a couple of hours to get under control with gabapentin and morphine. Once this is under control he can function very well throughout the day with his full-time job as a . No change in pain since last office visit.        Morphine extended release 15 mg twice daily, gabapentin 3600 mg daily that helps reduce pain and improve function up to 50%. Average pain score is 5 out of 10 with medication. Has an average quality of family and social life with current condition and treatment. Has not been to the ED for pain since last office visit. Toxicology consistent March 18, 2024.  Annual controlled substance agreement and opioid risk tool are completed and scanned into the chart March 21, 2024.     For continuity:   Given the patient's report of reduced pain and improved functional ability without adverse effects, it is reasonable to treat with narcotic medications. The terms of the opioid agreement as well as the potential risks and adverse effects of the patient's medication regimen were discussed in detail. This includes if  applicable due to dosage of medication permission to discuss and coordinate care with other treatment providers relevant to the patients condition. The patient verbalized understanding.      Risks and side effects of chronic opioid therapy including but not limited to tolerance, dependence, constipation, hyperalgesia, cognitive side effects, addiction and possible death due to overuse and or misuse were discussed. I also discussed that such medications when co-administered with other sedative agents including but not limited to alcohol, benzodiazepines, sedative hypnotics and illegal drugs could pose life threatening consequences including death. I also explained the impact that the administration of such medication has on a patient with obstructive sleep apnea and continued recommendations for use of apnea devices if ordered are prescribed by other physicians. In order to effectively and safely treat the pain, I also emphasized the importance of compliance with the treatment plan, as well as compliance with the terms of the opioid agreement, which was reviewed in detail. I explained the importance of being responsible with the medications and to take these only as prescribed, never in excess and never for reasons other than pain reduction. The patient was counseled on keeping the medications safe and locked away from children and other adults as well as disposal methods and options. The patient understood the risks and instructions.      I also discussed with the patient in detail that based on the clinical response to the opioid medications and improvements of activities of daily living, sleep, and work performance in light of compliance with the treatment plan we can continue this form of therapy for the above chronic pain. The goal and rationale used for current treatment with chronic opioid medication is to control the pain and alleviate disability induced by the chronic pain condition noted above after  failures of other non-opioid and nonpharmacological modalities to treat the chronic pain and the symptoms associated with have failed. The patient understood the goals in terms of the above treatment plan and had no further questions prior to leaving the office today.      Of note, the above-mentioned diagnoses/conditions and expected fluctuating nature of pain, and pain characteristic changes may lead to prolonged functional impairment requiring frequent and multiple reassessments with continued high level medical decision making. As noted, medication and medication management may require opiate therapy in excess of a routine less than 30 day medication requirement. The patient may require daily opiate therapy necessitating month-long prescription medication as noted above in order to perform activities of daily living and achieve acceptable quality of life with respect to their chronic pain condition for the foreseeable future. We monitor our patient's carefully through drug monitoring, medication counts, urine drug testing specific to their medication as well as a myriad of other substances and with frequent follow-ups with interval reassement of the chronic pain condition, its pathophysiology and prognosis.      The level of clinical decision making at this office visit is high due to high risks and complications including mortality and morbidity related to acute and chronic pain with respects to life, bodily function, and treatment. Risks and clinical decisions with respect to under treatment, failure to maintain adequate treatment, and/or overtreatment complications and outcomes were discussed with the patient with respect to their chronic pain conditions, interventional therapies, as well as the use of various medications including possible controlled/dangerous medications. The amount and complexity of reviewed data at this in subsequent office visits is high given patient's fluctuating clinical presentation,  laboratory and radiographic reports, prescription monitoring program data, and medication history as well as other relevant data as noted above. Pertinent negatives and positives data was used in consideration for the above-mentioned high complexity.       Given the patient's total MED, general use of daily opiates, or other coadministered medications in various classes the patient was offered a prescription for Narcan. I instructed the patient that it is important that patient fill this medication in order to demonstrate understanding of the gravity of possible side effects including respiratory depression and risk of overdose of this opiate load or medication combination. As such patient will be required to bring Narcan prescription to follow-up appointments as part of compliance with continued opiate care.      With respect to opiate induced constipation I discussed multiple ways to combat this problem including staying hydrated and taking over-the-counter medications such as Dulcolax, Miralax and Senna. If these treatments are not effective we could consider such medications as Amitiza, Linzess and Movantik.      Disclaimer: This note was transcribed using an audio transcription device. As such, minor errors may be present with regard to spelling, punctuation, and inadvertent word insertion. Please disregard such errors.    Review of Systems   Constitutional: Negative.    HENT: Negative.     Eyes: Negative.    Respiratory: Negative.     Cardiovascular: Negative.    Gastrointestinal: Negative.    Endocrine: Negative.    Genitourinary: Negative.    Musculoskeletal:  Negative for arthralgias, back pain, gait problem, joint swelling, myalgias, neck pain and neck stiffness.   Skin: Negative.    Allergic/Immunologic: Negative.    Neurological:  Positive for headaches. Negative for dizziness, facial asymmetry, weakness and numbness.   Hematological: Negative.    Psychiatric/Behavioral: Negative.         Physical  Exam  Vitals and nursing note reviewed.   Constitutional:       Appearance: Normal appearance.   HENT:      Head: Normocephalic and atraumatic.      Right Ear: External ear normal.      Left Ear: External ear normal.      Nose: Nose normal.   Eyes:      Extraocular Movements: Extraocular movements intact.      Conjunctiva/sclera: Conjunctivae normal.      Pupils: Pupils are equal, round, and reactive to light.   Cardiovascular:      Rate and Rhythm: Normal rate and regular rhythm.      Pulses: Normal pulses.      Comments: Systolic murmur right upper sternal border 2-3 out of 6.  Pulmonary:      Effort: Pulmonary effort is normal.      Breath sounds: Normal breath sounds.   Abdominal:      General: Abdomen is flat. Bowel sounds are normal.      Palpations: Abdomen is soft.   Musculoskeletal:         General: Normal range of motion.      Cervical back: Normal range of motion.   Skin:     General: Skin is warm and dry.      Capillary Refill: Capillary refill takes 2 to 3 seconds.   Neurological:      Mental Status: He is alert and oriented to person, place, and time.      Cranial Nerves: No cranial nerve deficit.      Sensory: No sensory deficit.      Motor: No weakness.      Coordination: Coordination normal.      Gait: Gait normal.      Deep Tendon Reflexes: Reflexes normal.   Psychiatric:         Mood and Affect: Mood normal.         Behavior: Behavior normal.           MRI Cervical without Contrast 31Mar2017 06:21PM Jina Garber   [Apr 6, 2017 3:30PM Delvin Stephen]  [facility] Intake Activity Log Entry by Delvin Stephen ([email or web address]) on 4/6/2017 3:25 PM  Status Change: To Closed - Confirmed-Appt Past   [Mar 23, 2017 4:23PM Jina Garber]  Reason: Unspecified for MRI Cervical without Contrast      Test Name Result Flag Reference   MRI Cervical without Contrast (Report)       Interpreted by: ARELI RENTERIA  04/01/17 14:11  MRN: [id number]  Patient Name: GA LAGOS     STUDY:  MRI CERVICAL WO; 3/31/2017  6:21 pm     INDICATION:  Signs/Symptoms: neck pain. Left-sided neck pain beginning October 2016.     COMPARISON:  None.     ACCESSION NUMBER(S):  [id number]     ORDERING CLINICIAN:  HAN MARIN     TECHNIQUE:  Sagittal T1, T2, axial T1 and axial T2 weighted images were acquired  through the cervical spine.     FINDINGS:  Alignment: The vertebral alignment is within normal limits.     Vertebrae/Intervertebral Discs: The vertebral bodies demonstrate  expected height.The marrow signal is within normal limits. There is  subtle disc space narrowing and endplate spurring at C6/7.     Cord: Normal in caliber and signal.     C1-C2: The cervicomedullary junction appears unremarkable. There is  no central canal stenosis.     C2-C3: There is no posterior disc contour abnormality. There is no  significant central canal or neural foraminal stenosis.     C3-C4: There is no posterior disc contour abnormality. Mild  uncovertebral joint hypertrophy and degenerative facet changes are  identified, left greater than right. There is mild left-sided neural  foraminal stenosis.     C4-C5: There is no posterior disc contour abnormality. There is no  significant central canal or neural foraminal stenosis.     C5-C6: There is mild posterior disc bulging and endplate spurring  with minimal effacement of the ventral subarachnoid space. There is  no significant central canal stenosis. Mild facet and uncovertebral  joint hypertrophy are identified, right greater than left. The neural  foramina remain patent.     C6-C7: There is a central disc protrusion partially effacing the  ventral subarachnoid space. There is also minimal narrowing of the  central canal. Mild uncovertebral joint hypertrophy is noted, right  greater than left. There is very mild right-sided neural foraminal  stenosis.     C7-T1: There is no posterior disc contour abnormality. There is no  significant central canal or neural foraminal stenosis.     The upper thoracic vertebrae  and spinal canal are unremarkable.     The prevertebral and posterior paraspinous soft tissues are within  normal limits.     IMPRESSION:  Very mild degenerative changes of the cervical spine.  Transcribed by: [email or web address], User  Electronically signed by: [email or web address], User  04/01/17 14:11          Sebastian was seen today for med refill and pain.  Diagnoses and all orders for this visit:  Occipital neuralgia of left side (Primary)  -     morphine CR (MS Contin) 15 mg 12 hr tablet; Take 1 tablet (15 mg) by mouth 2 times a day. Do not fill before December 19, 2024.  -     morphine CR (MS Contin) 15 mg 12 hr tablet; Take 1 tablet (15 mg) by mouth 2 times a day. Do not fill before January 18, 2025.  -     morphine CR (MS Contin) 15 mg 12 hr tablet; Take 1 tablet (15 mg) by mouth 2 times a day. Do not fill before February 17, 2025.  Postherpetic neuralgia  -     morphine CR (MS Contin) 15 mg 12 hr tablet; Take 1 tablet (15 mg) by mouth 2 times a day. Do not fill before December 19, 2024.  -     morphine CR (MS Contin) 15 mg 12 hr tablet; Take 1 tablet (15 mg) by mouth 2 times a day. Do not fill before January 18, 2025.  -     morphine CR (MS Contin) 15 mg 12 hr tablet; Take 1 tablet (15 mg) by mouth 2 times a day. Do not fill before February 17, 2025.     Follow-up 12 weeks.

## 2024-12-19 ENCOUNTER — PHARMACY VISIT (OUTPATIENT)
Dept: PHARMACY | Facility: CLINIC | Age: 54
End: 2024-12-19
Payer: COMMERCIAL

## 2024-12-19 PROCEDURE — RXMED WILLOW AMBULATORY MEDICATION CHARGE

## 2025-01-09 PROBLEM — J93.83 SPONTANEOUS PNEUMOTHORAX: Status: ACTIVE | Noted: 2025-01-09

## 2025-01-09 PROBLEM — Z86.69 HISTORY OF MIGRAINE: Status: ACTIVE | Noted: 2025-01-09

## 2025-01-09 PROBLEM — Z86.19 HISTORY OF HERPES ZOSTER: Status: ACTIVE | Noted: 2025-01-09

## 2025-01-14 ENCOUNTER — LAB (OUTPATIENT)
Dept: LAB | Facility: LAB | Age: 55
End: 2025-01-14
Payer: COMMERCIAL

## 2025-01-14 DIAGNOSIS — Z00.00 WELL ADULT HEALTH CHECK: ICD-10-CM

## 2025-01-14 LAB
ALBUMIN SERPL BCP-MCNC: 4.3 G/DL (ref 3.4–5)
ALP SERPL-CCNC: 78 U/L (ref 33–120)
ALT SERPL W P-5'-P-CCNC: 14 U/L (ref 10–52)
ANION GAP SERPL CALC-SCNC: 9 MMOL/L (ref 10–20)
AST SERPL W P-5'-P-CCNC: 15 U/L (ref 9–39)
BASOPHILS # BLD AUTO: 0.07 X10*3/UL (ref 0–0.1)
BASOPHILS NFR BLD AUTO: 1.2 %
BILIRUB SERPL-MCNC: 0.6 MG/DL (ref 0–1.2)
BUN SERPL-MCNC: 11 MG/DL (ref 6–23)
CALCIUM SERPL-MCNC: 8.8 MG/DL (ref 8.6–10.3)
CHLORIDE SERPL-SCNC: 106 MMOL/L (ref 98–107)
CHOLEST SERPL-MCNC: 222 MG/DL (ref 0–199)
CHOLESTEROL/HDL RATIO: 3.8
CO2 SERPL-SCNC: 28 MMOL/L (ref 21–32)
CREAT SERPL-MCNC: 0.98 MG/DL (ref 0.5–1.3)
EGFRCR SERPLBLD CKD-EPI 2021: >90 ML/MIN/1.73M*2
EOSINOPHIL # BLD AUTO: 0.25 X10*3/UL (ref 0–0.7)
EOSINOPHIL NFR BLD AUTO: 4.3 %
ERYTHROCYTE [DISTWIDTH] IN BLOOD BY AUTOMATED COUNT: 12.7 % (ref 11.5–14.5)
GLUCOSE SERPL-MCNC: 90 MG/DL (ref 74–99)
HCT VFR BLD AUTO: 46.2 % (ref 41–52)
HDLC SERPL-MCNC: 58.2 MG/DL
HGB BLD-MCNC: 15.2 G/DL (ref 13.5–17.5)
IMM GRANULOCYTES # BLD AUTO: 0.03 X10*3/UL (ref 0–0.7)
IMM GRANULOCYTES NFR BLD AUTO: 0.5 % (ref 0–0.9)
LDLC SERPL CALC-MCNC: 142 MG/DL
LYMPHOCYTES # BLD AUTO: 1.9 X10*3/UL (ref 1.2–4.8)
LYMPHOCYTES NFR BLD AUTO: 32.5 %
MCH RBC QN AUTO: 29.3 PG (ref 26–34)
MCHC RBC AUTO-ENTMCNC: 32.9 G/DL (ref 32–36)
MCV RBC AUTO: 89 FL (ref 80–100)
MONOCYTES # BLD AUTO: 0.35 X10*3/UL (ref 0.1–1)
MONOCYTES NFR BLD AUTO: 6 %
NEUTROPHILS # BLD AUTO: 3.25 X10*3/UL (ref 1.2–7.7)
NEUTROPHILS NFR BLD AUTO: 55.5 %
NON HDL CHOLESTEROL: 164 MG/DL (ref 0–149)
NRBC BLD-RTO: 0 /100 WBCS (ref 0–0)
PLATELET # BLD AUTO: 184 X10*3/UL (ref 150–450)
POTASSIUM SERPL-SCNC: 3.9 MMOL/L (ref 3.5–5.3)
PROT SERPL-MCNC: 6.4 G/DL (ref 6.4–8.2)
RBC # BLD AUTO: 5.18 X10*6/UL (ref 4.5–5.9)
SODIUM SERPL-SCNC: 139 MMOL/L (ref 136–145)
TRIGL SERPL-MCNC: 108 MG/DL (ref 0–149)
TSH SERPL-ACNC: 3.07 MIU/L (ref 0.44–3.98)
VIT B12 SERPL-MCNC: 549 PG/ML (ref 211–911)
VLDL: 22 MG/DL (ref 0–40)
WBC # BLD AUTO: 5.9 X10*3/UL (ref 4.4–11.3)

## 2025-01-14 PROCEDURE — 84443 ASSAY THYROID STIM HORMONE: CPT

## 2025-01-14 PROCEDURE — 80053 COMPREHEN METABOLIC PANEL: CPT

## 2025-01-14 PROCEDURE — 82607 VITAMIN B-12: CPT

## 2025-01-14 PROCEDURE — 85025 COMPLETE CBC W/AUTO DIFF WBC: CPT

## 2025-01-14 PROCEDURE — 82652 VIT D 1 25-DIHYDROXY: CPT

## 2025-01-14 PROCEDURE — 80061 LIPID PANEL: CPT

## 2025-01-14 PROCEDURE — 36415 COLL VENOUS BLD VENIPUNCTURE: CPT

## 2025-01-15 ENCOUNTER — APPOINTMENT (OUTPATIENT)
Dept: PRIMARY CARE | Facility: CLINIC | Age: 55
End: 2025-01-15
Payer: COMMERCIAL

## 2025-01-15 VITALS
SYSTOLIC BLOOD PRESSURE: 106 MMHG | WEIGHT: 198 LBS | HEART RATE: 78 BPM | DIASTOLIC BLOOD PRESSURE: 64 MMHG | OXYGEN SATURATION: 98 % | TEMPERATURE: 97.8 F | HEIGHT: 75 IN | BODY MASS INDEX: 24.62 KG/M2

## 2025-01-15 DIAGNOSIS — B02.29 POSTHERPETIC NEURALGIA: ICD-10-CM

## 2025-01-15 DIAGNOSIS — E53.8 VITAMIN B 12 DEFICIENCY: ICD-10-CM

## 2025-01-15 DIAGNOSIS — Z79.899 MEDICATION MANAGEMENT: ICD-10-CM

## 2025-01-15 DIAGNOSIS — Q23.81 BICUSPID AORTIC VALVE: ICD-10-CM

## 2025-01-15 DIAGNOSIS — I71.21 ANEURYSM OF ASCENDING AORTA WITHOUT RUPTURE (CMS-HCC): ICD-10-CM

## 2025-01-15 DIAGNOSIS — R73.9 ELEVATED BLOOD SUGAR: ICD-10-CM

## 2025-01-15 DIAGNOSIS — Z00.00 WELL ADULT EXAM: Primary | ICD-10-CM

## 2025-01-15 DIAGNOSIS — Z87.09 HX OF PNEUMOTHORAX: ICD-10-CM

## 2025-01-15 DIAGNOSIS — Q23.1 CONGENITAL INSUFFICIENCY OF AORTIC VALVE (HHS-HCC): ICD-10-CM

## 2025-01-15 DIAGNOSIS — M54.81 OCCIPITAL NEURALGIA OF LEFT SIDE: ICD-10-CM

## 2025-01-15 DIAGNOSIS — E55.9 VITAMIN D DEFICIENCY: ICD-10-CM

## 2025-01-15 LAB — POC HEMOGLOBIN A1C: 5 % (ref 4.2–6.5)

## 2025-01-15 PROCEDURE — 99396 PREV VISIT EST AGE 40-64: CPT | Performed by: FAMILY MEDICINE

## 2025-01-15 PROCEDURE — 80368 SEDATIVE HYPNOTICS: CPT

## 2025-01-15 PROCEDURE — 83036 HEMOGLOBIN GLYCOSYLATED A1C: CPT | Performed by: FAMILY MEDICINE

## 2025-01-15 PROCEDURE — 3008F BODY MASS INDEX DOCD: CPT | Performed by: FAMILY MEDICINE

## 2025-01-15 PROCEDURE — 99213 OFFICE O/P EST LOW 20 MIN: CPT | Performed by: FAMILY MEDICINE

## 2025-01-15 PROCEDURE — 80354 DRUG SCREENING FENTANYL: CPT

## 2025-01-15 PROCEDURE — 1036F TOBACCO NON-USER: CPT | Performed by: FAMILY MEDICINE

## 2025-01-15 PROCEDURE — 80361 OPIATES 1 OR MORE: CPT

## 2025-01-15 PROCEDURE — 80307 DRUG TEST PRSMV CHEM ANLYZR: CPT

## 2025-01-15 PROCEDURE — 80373 DRUG SCREENING TRAMADOL: CPT

## 2025-01-15 PROCEDURE — 82570 ASSAY OF URINE CREATININE: CPT

## 2025-01-15 PROCEDURE — RXMED WILLOW AMBULATORY MEDICATION CHARGE

## 2025-01-15 PROCEDURE — 80346 BENZODIAZEPINES1-12: CPT

## 2025-01-15 PROCEDURE — 80358 DRUG SCREENING METHADONE: CPT

## 2025-01-15 PROCEDURE — 80365 DRUG SCREENING OXYCODONE: CPT

## 2025-01-15 RX ORDER — MORPHINE SULFATE 15 MG/1
15 TABLET, FILM COATED, EXTENDED RELEASE ORAL 2 TIMES DAILY
Qty: 60 TABLET | Refills: 0 | Status: SHIPPED | OUTPATIENT
Start: 2025-01-18 | End: 2025-02-17

## 2025-01-15 RX ORDER — LIDOCAINE AND PRILOCAINE 25; 25 MG/G; MG/G
CREAM TOPICAL ONCE
Qty: 30 G | Refills: 3 | Status: SHIPPED | OUTPATIENT
Start: 2025-01-15 | End: 2025-01-17

## 2025-01-15 ASSESSMENT — PATIENT HEALTH QUESTIONNAIRE - PHQ9
1. LITTLE INTEREST OR PLEASURE IN DOING THINGS: NOT AT ALL
2. FEELING DOWN, DEPRESSED OR HOPELESS: NOT AT ALL
SUM OF ALL RESPONSES TO PHQ9 QUESTIONS 1 AND 2: 0

## 2025-01-15 NOTE — PROGRESS NOTES
Subjective   Patient ID: Sebastian Andrews is a 54 y.o. male who presents for Well exam discussed pain medications      Medication cuts it in half and is 8/10 without medications.  Pain is on the scalp left side post herpetic neuralgia  Had seen neurology and received steroid injections without relief.    Saw Pain Medicine and they are currently using MS contin 15 mg  q 1`2 hours and gabapentin 1200 mg 3 x day    Exercise- active  Diet Eating   Psa Normal  No urinary issues  Needs to do cologuard and no colon cancer in the family    OARRS:  Addison Tovar, DO on 1/15/2025  9:41 AM  I have personally reviewed the OARRS report for Seabstian Andrews. I have considered the risks of abuse, dependence, addiction and diversion    Is the patient prescribed a combination of a benzodiazepine and opioid?  Yes, I feel it is clincially indicated to continue the medication and have discussed with the patient risks/benefits/alternatives.    Last Urine Drug Screen / ordered today: Yes  Recent Results (from the past 8760 hours)   Opiate Confirmation, Urine    Collection Time: 03/18/24 10:23 AM   Result Value Ref Range    6-Acetylmorphine <25 <25 ng/mL    Codeine <50 <50 ng/mL    Hydrocodone <25 <25 ng/mL    Hydromorphone 170 (H) <25 ng/mL    Morphine  >2,500 (H) <50 ng/mL    Norhydrocodone <25 <25 ng/mL    Noroxycodone <25 <25 ng/mL    Oxycodone <25 <25 ng/mL    Oxymorphone <25 <25 ng/mL   Drug Screen, Urine With Reflex to Confirmation    Collection Time: 03/18/24 10:23 AM   Result Value Ref Range    Amphetamine Screen, Urine Presumptive Negative Presumptive Negative    Barbiturate Screen, Urine Presumptive Negative Presumptive Negative    Benzodiazepines Screen, Urine Presumptive Negative Presumptive Negative    Cannabinoid Screen, Urine Presumptive Negative Presumptive Negative    Cocaine Metabolite Screen, Urine Presumptive Negative Presumptive Negative    Fentanyl Screen, Urine Presumptive Negative Presumptive Negative    Opiate  "Screen, Urine Presumptive Positive (A) Presumptive Negative    Oxycodone Screen, Urine Presumptive Negative Presumptive Negative    PCP Screen, Urine Presumptive Negative Presumptive Negative    Methadone Screen, Urine Presumptive Negative Presumptive Negative     N/A        Controlled Substance Agreement:  Date of the Last Agreement: 1/15/24  Reviewed Controlled Substance Agreement including but not limited to the benefits, risks, and alternatives to treatment with a Controlled Substance medication(s).    Opioids:  What is the patient's goal of therapy? Treat postherpetic neuralgia  Is this being achieved with current treatment? Yes.  Has been seeing [facility] medicine    I have calculated the patient's Morphine Dose Equivalent (MED):   I have considered referral to Pain Management and/or a specialist, and do not feel it is necessary at this time.    I feel that it is clinically indicated to continue this current medication regimen after consideration of alternative therapies, and other non-opioid treatment.    Opioid Risk Screening:  No data recorded  Low    Pain Assessment:  8/10 with medication and 5/10 with medication      Review of Systems   All other systems reviewed and are negative.      Objective   /64 (BP Location: Left arm, Patient Position: Sitting, BP Cuff Size: Adult)   Pulse 78   Temp 36.6 °C (97.8 °F)   Ht 1.905 m (6' 3\")   Wt 89.8 kg (198 lb)   SpO2 98%   BMI 24.75 kg/m²     General: Patient is alert and oriented ×3 and appears in no acute distress. No respiratory distress.    Head: Atraumatic normocephalic.    Eyes: EOMI, PERRLA      Ears: Canals patent without any irritation, tympanic membranes without inflammation, no swelling, normal light reflex.    Nose: Nares patent. Turbinates are not swollen. No discharge.    Mouth: Normal mucosa. Moist. No erythema, exudates, tonsillar enlargement.    Neck: Normal range of motion, no masses.  Thyroid is palpable and normal in size without any " nodules. No anterior cervical or posterior cervical adenopathy.    Heart: Regular rate and rhythm, no clicks or gallops, 3 out of 6 systolic murmur heard best at the right upper sternal border    Lungs: Clear to auscultation bilaterally without any rhonchi rales or wheezing, lung sounds heard throughout all lung fields    Abdomen: Soft, nontender, no rigidity, rebound, guarding or organomegaly. Bowel sounds ×4 quadrants.    Musculoskeletal: Normal range of motion, strength is grossly intact in the proximal distal muscles of the upper and lower extremities bilaterally, deep tendon reflexes +2 out of 4 and symmetric bilaterally at the patella, Achilles, biceps, triceps, sensation intact.    Nerves: Cranial nerves II through XII appear grossly intact and without deficit    Skin: Intact, dry, no rashes or erythema    Psych: Normal affect.    Assessment/Plan   Problem List Items Addressed This Visit       Bicuspid aortic valve    Occipital neuralgia of left side    Relevant Orders    Opiate/Opioid/Benzo Prescription Compliance    Postherpetic neuralgia    Relevant Medications    lidocaine-prilocaine (Emla) 2.5-2.5 % cream    Other Relevant Orders    Opiate/Opioid/Benzo Prescription Compliance    Thoracic aortic aneurysm without rupture (CMS-HCC)    Congenital insufficiency of aortic valve ([facility])     Other Visit Diagnoses       Well adult exam    -  Primary    Elevated blood sugar        Relevant Orders    POCT glycosylated hemoglobin (Hb A1C) manually resulted (Completed)    Hx of pneumothorax        Vitamin B 12 deficiency        Vitamin D deficiency        Medication management        Relevant Orders    Opiate/Opioid/Benzo Prescription Compliance          Postherpetic neuralgia  Left-sided head.  Follow with pain management.  On MS Contin and gabapentin.  - Given Shingrix vaccination   - We will take over prescriptions  - Urine drug screen ordered 1/15/25  - Prescription agreement 1/15/25  - OARRS reviewed and no  abberant behavior.   - Lidocaine topical sent over to try to decrease pain  - Capcasian cream     Bicuspid aortic valve and enlarging ascending aorta  - Following with Dr. Conner's cardiology  - Patient did have hyperlipidemia and stated to discuss with Dr. Conner starting statin.    Prostate cancer screening was done January 2024.  Patient has no symptoms.  PSA was normal 0.53    Colon cancer screening needs to be done and patient has Cologuard that he will do at home.  There is no family history of colon cancer. He will do at home    Patient has vitamin B-12 deficiency  - vitamin B12 sublingual    Vitamin D deficiency  - vitamin D 50,000 IUs weekly with food     Possible Marfans  - has enlarged aorta and history of spontaneous pneumothorax.

## 2025-01-16 ENCOUNTER — PHARMACY VISIT (OUTPATIENT)
Dept: PHARMACY | Facility: CLINIC | Age: 55
End: 2025-01-16
Payer: COMMERCIAL

## 2025-01-16 LAB
1,25(OH)2D SERPL-MCNC: 47.8 PG/ML (ref 19.9–79.3)
AMPHETAMINES UR QL SCN: NORMAL
BARBITURATES UR QL SCN: NORMAL
BZE UR QL SCN: NORMAL
CANNABINOIDS UR QL SCN: NORMAL
CREAT UR-MCNC: 148.2 MG/DL (ref 20–370)
PCP UR QL SCN: NORMAL

## 2025-01-18 LAB
1OH-MIDAZOLAM UR CFM-MCNC: <25 NG/ML
6MAM UR CFM-MCNC: <25 NG/ML
7AMINOCLONAZEPAM UR CFM-MCNC: <25 NG/ML
A-OH ALPRAZ UR CFM-MCNC: <25 NG/ML
ALPRAZ UR CFM-MCNC: <25 NG/ML
CHLORDIAZEP UR CFM-MCNC: <25 NG/ML
CLONAZEPAM UR CFM-MCNC: <25 NG/ML
CODEINE UR CFM-MCNC: <50 NG/ML
DIAZEPAM UR CFM-MCNC: <25 NG/ML
EDDP UR CFM-MCNC: <25 NG/ML
FENTANYL UR CFM-MCNC: <2.5 NG/ML
HYDROCODONE CTO UR CFM-MCNC: <25 NG/ML
HYDROMORPHONE UR CFM-MCNC: 271 NG/ML
LORAZEPAM UR CFM-MCNC: <25 NG/ML
METHADONE UR CFM-MCNC: <25 NG/ML
MIDAZOLAM UR CFM-MCNC: <25 NG/ML
MORPHINE UR CFM-MCNC: >2500 NG/ML
NORDIAZEPAM UR CFM-MCNC: <25 NG/ML
NORFENTANYL UR CFM-MCNC: <2.5 NG/ML
NORHYDROCODONE UR CFM-MCNC: <25 NG/ML
NOROXYCODONE UR CFM-MCNC: <25 NG/ML
NORTRAMADOL UR-MCNC: <50 NG/ML
OXAZEPAM UR CFM-MCNC: <25 NG/ML
OXYCODONE UR CFM-MCNC: <25 NG/ML
OXYMORPHONE UR CFM-MCNC: <25 NG/ML
TEMAZEPAM UR CFM-MCNC: <25 NG/ML
TRAMADOL UR CFM-MCNC: <50 NG/ML
ZOLPIDEM UR CFM-MCNC: <25 NG/ML
ZOLPIDEM UR-MCNC: <25 NG/ML

## 2025-02-17 ENCOUNTER — PHARMACY VISIT (OUTPATIENT)
Dept: PHARMACY | Facility: CLINIC | Age: 55
End: 2025-02-17
Payer: COMMERCIAL

## 2025-02-17 PROCEDURE — RXMED WILLOW AMBULATORY MEDICATION CHARGE

## 2025-03-11 DIAGNOSIS — M54.81 OCCIPITAL NEURALGIA OF LEFT SIDE: ICD-10-CM

## 2025-03-11 DIAGNOSIS — B02.29 POSTHERPETIC NEURALGIA: ICD-10-CM

## 2025-03-11 RX ORDER — MORPHINE SULFATE 15 MG/1
15 TABLET, FILM COATED, EXTENDED RELEASE ORAL 2 TIMES DAILY
Qty: 60 TABLET | Refills: 0 | Status: SHIPPED | OUTPATIENT
Start: 2025-03-11 | End: 2025-04-10

## 2025-03-11 NOTE — TELEPHONE ENCOUNTER
Pt jana stating you were going to be taking over his medications from pain management, he states his morphine is going to run out on 3/19/25 and his follow up with you is not until 4/16/25, do we need to move this appointment up?

## 2025-03-12 ENCOUNTER — APPOINTMENT (OUTPATIENT)
Dept: PAIN MEDICINE | Facility: CLINIC | Age: 55
End: 2025-03-12
Payer: COMMERCIAL

## 2025-03-17 ENCOUNTER — PHARMACY VISIT (OUTPATIENT)
Dept: PHARMACY | Facility: CLINIC | Age: 55
End: 2025-03-17
Payer: COMMERCIAL

## 2025-03-17 PROCEDURE — RXMED WILLOW AMBULATORY MEDICATION CHARGE

## 2025-03-27 ENCOUNTER — OFFICE VISIT (OUTPATIENT)
Dept: CARDIOLOGY | Facility: HOSPITAL | Age: 55
End: 2025-03-27
Payer: COMMERCIAL

## 2025-03-27 VITALS
WEIGHT: 203.26 LBS | BODY MASS INDEX: 25.41 KG/M2 | SYSTOLIC BLOOD PRESSURE: 126 MMHG | DIASTOLIC BLOOD PRESSURE: 85 MMHG | HEART RATE: 61 BPM | OXYGEN SATURATION: 95 %

## 2025-03-27 DIAGNOSIS — Q23.81 BICUSPID AORTIC VALVE: Primary | ICD-10-CM

## 2025-03-27 DIAGNOSIS — Q23.1 CONGENITAL INSUFFICIENCY OF AORTIC VALVE (HHS-HCC): ICD-10-CM

## 2025-03-27 DIAGNOSIS — I77.810 ASCENDING AORTA DILATATION: ICD-10-CM

## 2025-03-27 LAB
ATRIAL RATE: 63 BPM
P AXIS: 58 DEGREES
P OFFSET: 204 MS
P ONSET: 139 MS
PR INTERVAL: 158 MS
Q ONSET: 218 MS
QRS COUNT: 10 BEATS
QRS DURATION: 88 MS
QT INTERVAL: 398 MS
QTC CALCULATION(BAZETT): 407 MS
QTC FREDERICIA: 404 MS
R AXIS: 29 DEGREES
T AXIS: 31 DEGREES
T OFFSET: 417 MS
VENTRICULAR RATE: 63 BPM

## 2025-03-27 PROCEDURE — 93005 ELECTROCARDIOGRAM TRACING: CPT | Performed by: INTERNAL MEDICINE

## 2025-03-27 PROCEDURE — 99213 OFFICE O/P EST LOW 20 MIN: CPT | Performed by: INTERNAL MEDICINE

## 2025-03-27 NOTE — PROGRESS NOTES
Chief Complaint:   Follow-up     History Of Present Illness:    Sebastian Andrews is a 55 y.o. male presenting for follow-up.  Doing well from a cardiac standpoint.  Denies any angina, shortness of breath, dizziness, palpitations.     Last Recorded Vitals:  Vitals:    03/27/25 0824   BP: 126/85   Pulse: 61   SpO2: 95%   Weight: 92.2 kg (203 lb 4.2 oz)       Past Medical History:  He has a past medical history of Encounter for other specified prophylactic measures (01/08/2018), Other pneumothorax, Personal history of other diseases of the circulatory system, Personal history of other diseases of the nervous system and sense organs, and Personal history of other infectious and parasitic diseases.    Past Surgical History:  He has no past surgical history on file.      Social History:  He reports that he has never smoked. He has never used smokeless tobacco. He reports that he does not currently use alcohol. He reports that he does not use drugs.    Family History:  Family History   Problem Relation Name Age of Onset    Diabetes Mother      Other (cardiovascular disease) Mother      Other (cardiovascular disease) Father      Coronary artery disease Maternal Grandmother      Emphysema Other          Allergies:  Patient has no known allergies.    Outpatient Medications:  Current Outpatient Medications   Medication Instructions    cyanocobalamin (VITAMIN B-12) 1,000 mcg, Daily    gabapentin (NEURONTIN) 1,200 mg, oral, 3 times daily    morphine CR (MS CONTIN) 15 mg, oral, 2 times daily    naloxone (Narcan) 4 mg/0.1 mL nasal spray USE AS DIRECTED    naloxone (NARCAN) 4 mg, nasal, As needed, May repeat every 2-3 minutes if needed, alternating nostrils, until medical assistance becomes available.       Physical Exam:  Constitutional:       Appearance: Healthy appearance. Not in distress.   Neck:      Vascular: No JVR. JVD normal.   Pulmonary:      Effort: Pulmonary effort is normal.      Breath sounds: Normal breath sounds. No  wheezing. No rhonchi. No rales.   Chest:      Chest wall: Not tender to palpatation.   Cardiovascular:      Normal rate. Regular rhythm.      Murmurs: There is no murmur.   Edema:     Peripheral edema absent.   Abdominal:      General: Bowel sounds are normal.      Palpations: Abdomen is soft.      Tenderness: There is no abdominal tenderness.   Musculoskeletal: Normal range of motion. Skin:     General: Skin is warm and dry.   Neurological:      General: No focal deficit present.      Mental Status: Alert and oriented to person, place and time.           Last Labs:  CBC -  Lab Results   Component Value Date    WBC 5.9 01/14/2025    HGB 15.2 01/14/2025    HCT 46.2 01/14/2025    MCV 89 01/14/2025     01/14/2025       CMP -  Lab Results   Component Value Date    CALCIUM 8.8 01/14/2025    PROT 6.4 01/14/2025    ALBUMIN 4.3 01/14/2025    AST 15 01/14/2025    ALT 14 01/14/2025    ALKPHOS 78 01/14/2025    BILITOT 0.6 01/14/2025       LIPID PANEL -   Lab Results   Component Value Date    CHOL 222 (H) 01/14/2025    TRIG 108 01/14/2025    HDL 58.2 01/14/2025    CHHDL 3.8 01/14/2025    LDLF 138 (H) 03/03/2020    VLDL 22 01/14/2025    NHDL 164 (H) 01/14/2025       RENAL FUNCTION PANEL -   Lab Results   Component Value Date    GLUCOSE 90 01/14/2025     01/14/2025    K 3.9 01/14/2025     01/14/2025    CO2 28 01/14/2025    ANIONGAP 9 (L) 01/14/2025    BUN 11 01/14/2025    CREATININE 0.98 01/14/2025    CALCIUM 8.8 01/14/2025    ALBUMIN 4.3 01/14/2025        Lab Results   Component Value Date    HGBA1C 5.0 01/15/2025       Last Cardiology Tests:  ECG independently reviewed from today: Sinus rhythm, rate 63, nonspecific T wave abnormality    CAC score 5/29/24  1. Coronary artery calcium score of 0*.  2. Ascending aortic aneurysm measuring4.6 cm on axial images at the  level of the main pulmonary artery. Recommend follow-up radiation  sparing cardiac MRI and thoracic MRA to assess status of the aortic  valve and  size and extent of the aneurysm in approximately 12 months.  In comparison to a prior CT scan performed 03/30/2023 the size of the  aneurysm appears to be unchanged.    CTA 3/2023:  Annulus  3.2 x 2.4 cm  Root (Sinus of Valsalva)  4.2 x 2.7 cm  Sinotubular junction 3.7 cm  Mid ascending  4.7 x 4.7 cm  Mid descending 2.3 cm    Assessment/Plan   Very pleasant 55-year-old gentleman with bicuspid aortic valve and moderately dilated aortic root--mid ascending aorta 4.7 cm on CTA in 2023.  We did a limited echocardiogram today which shows stable aortic size well-visualized at 4.7 cm maximally with trivial AI.  Blood pressure is well-controlled.  Reassurance provided, we will see him again in 1 year or sooner if needed.         Josep Conner MD

## 2025-03-31 PROCEDURE — RXMED WILLOW AMBULATORY MEDICATION CHARGE

## 2025-04-02 ENCOUNTER — PHARMACY VISIT (OUTPATIENT)
Dept: PHARMACY | Facility: CLINIC | Age: 55
End: 2025-04-02
Payer: COMMERCIAL

## 2025-04-16 ENCOUNTER — PHARMACY VISIT (OUTPATIENT)
Dept: PHARMACY | Facility: CLINIC | Age: 55
End: 2025-04-16
Payer: COMMERCIAL

## 2025-04-16 ENCOUNTER — APPOINTMENT (OUTPATIENT)
Dept: PRIMARY CARE | Facility: CLINIC | Age: 55
End: 2025-04-16
Payer: COMMERCIAL

## 2025-04-16 VITALS
DIASTOLIC BLOOD PRESSURE: 72 MMHG | OXYGEN SATURATION: 97 % | HEIGHT: 75 IN | SYSTOLIC BLOOD PRESSURE: 126 MMHG | TEMPERATURE: 98.1 F | WEIGHT: 195 LBS | BODY MASS INDEX: 24.25 KG/M2 | HEART RATE: 67 BPM

## 2025-04-16 DIAGNOSIS — Z12.11 COLON CANCER SCREENING: Primary | ICD-10-CM

## 2025-04-16 DIAGNOSIS — M54.81 OCCIPITAL NEURALGIA OF LEFT SIDE: ICD-10-CM

## 2025-04-16 DIAGNOSIS — B02.29 POSTHERPETIC NEURALGIA: ICD-10-CM

## 2025-04-16 PROCEDURE — 99214 OFFICE O/P EST MOD 30 MIN: CPT | Performed by: FAMILY MEDICINE

## 2025-04-16 PROCEDURE — RXMED WILLOW AMBULATORY MEDICATION CHARGE

## 2025-04-16 PROCEDURE — 1036F TOBACCO NON-USER: CPT | Performed by: FAMILY MEDICINE

## 2025-04-16 PROCEDURE — 3008F BODY MASS INDEX DOCD: CPT | Performed by: FAMILY MEDICINE

## 2025-04-16 RX ORDER — LIDOCAINE 50 MG/G
1 PATCH TOPICAL DAILY
Qty: 30 PATCH | Refills: 2 | Status: SHIPPED | OUTPATIENT
Start: 2025-04-16 | End: 2026-04-16

## 2025-04-16 RX ORDER — MORPHINE SULFATE 15 MG/1
15 TABLET, FILM COATED, EXTENDED RELEASE ORAL 2 TIMES DAILY
Qty: 60 TABLET | Refills: 0 | Status: SHIPPED | OUTPATIENT
Start: 2025-04-16 | End: 2025-05-17

## 2025-04-16 NOTE — PROGRESS NOTES
Subjective   Patient ID: Sebastian Andrews is a 55 y.o. male who presents for post herpetic neuralgia and pain medications      Medication cuts it in half 4/10 and is 8/10 without medications.  Pain is on the scalp left side post herpetic neuralgia  Had seen neurology and received steroid injections without relief.    Saw Pain Medicine and they are currently using MS contin 15 mg  q 1`2 hours and gabapentin 1200 mg 3 x day    Exercise- active  Diet Eating   Psa Normal  No urinary issues  Needs to do cologuard and no colon cancer in the family    OARRS:  Addison Tovar, DO on 4/15/2025  8:30 PM  I have personally reviewed the OARRS report for Sebastian Andrews. I have considered the risks of abuse, dependence, addiction and diversion    Is the patient prescribed a combination of a benzodiazepine and opioid?  Yes, I feel it is clincially indicated to continue the medication and have discussed with the patient risks/benefits/alternatives.    Last Urine Drug Screen / ordered today: Yes  Recent Results (from the past 8760 hours)   Confirmation Opiate/Opioid/Benzo Prescription Compliance    Collection Time: 01/15/25 10:04 AM   Result Value Ref Range    Clonazepam <25 <25 ng/mL    7-Aminoclonazepam <25 <25 ng/mL    Alprazolam <25 <25 ng/mL    Alpha-Hydroxyalprazolam <25 <25 ng/mL    Midazolam <25 <25 ng/mL    Alpha-Hydroxymidazolam <25 <25 ng/mL    Chlordiazepoxide <25 <25 ng/mL    Diazepam <25 <25 ng/mL    Nordiazepam <25 <25 ng/mL    Temazepam <25 <25 ng/mL    Oxazepam <25 <25 ng/mL    Lorazepam <25 <25 ng/mL    Methadone <25 <25 ng/mL    EDDP <25 <25 ng/mL    6-Acetylmorphine <25 <25 ng/mL    Codeine <50 <50 ng/mL    Hydrocodone <25 <25 ng/mL    Hydromorphone 271 (H) <25 ng/mL    Morphine  >2,500 (H) <50 ng/mL    Norhydrocodone <25 <25 ng/mL    Noroxycodone <25 <25 ng/mL    Oxycodone <25 <25 ng/mL    Oxymorphone <25 <25 ng/mL    Fentanyl <2.5 <2.5 ng/mL    Norfentanyl <2.5 <2.5 ng/mL    Tramadol <50 <50 ng/mL     "O-Desmethyltramadol <50 <50 ng/mL    Zolpidem <25 <25 ng/mL    Zolpidem Metabolite (ZCA) <25 <25 ng/mL   Screen Opiate/Opioid/Benzo Prescription Compliance    Collection Time: 01/15/25 10:04 AM   Result Value Ref Range    Creatinine, Urine Random 148.2 20.0 - 370.0 mg/dL    Amphetamine Screen, Urine Presumptive Negative Presumptive Negative    Barbiturate Screen, Urine Presumptive Negative Presumptive Negative    Cannabinoid Screen, Urine Presumptive Negative Presumptive Negative    Cocaine Metabolite Screen, Urine Presumptive Negative Presumptive Negative    PCP Screen, Urine Presumptive Negative Presumptive Negative     N/A        Controlled Substance Agreement:  Date of the Last Agreement: 1/15/24  Reviewed Controlled Substance Agreement including but not limited to the benefits, risks, and alternatives to treatment with a Controlled Substance medication(s).    Opioids:  What is the patient's goal of therapy? Treat postherpetic neuralgia  Is this being achieved with current treatment? Yes.  Has been seeing pain medicine    I have calculated the patient's Morphine Dose Equivalent (MED):   I have considered referral to Pain Management and/or a specialist, and do not feel it is necessary at this time.    I feel that it is clinically indicated to continue this current medication regimen after consideration of alternative therapies, and other non-opioid treatment.    Opioid Risk Screening:  Low    Pain Assessment:  8/10 with medication and 5/10 with medication    Physically better, mental is same, Social better, sleep better on the medication    Overall benefit.   5 in the Am woken by the pain    Review of Systems   All other systems reviewed and are negative.      Objective   /72 (BP Location: Left arm, Patient Position: Sitting, BP Cuff Size: Adult)   Pulse 67   Temp 36.7 °C (98.1 °F)   Ht 1.905 m (6' 3\")   Wt 88.5 kg (195 lb)   SpO2 97%   BMI 24.37 kg/m²     General: Patient is alert and oriented ×3 and " appears in no acute distress. No respiratory distress.    Head: Atraumatic normocephalic.    Eyes: EOMI, PERRLA      Ears: Canals patent without any irritation, tympanic membranes without inflammation, no swelling, normal light reflex.    Nose: Nares patent. Turbinates are not swollen. No discharge.    Mouth: Normal mucosa. Moist. No erythema, exudates, tonsillar enlargement.    Neck: Normal range of motion, no masses.  Thyroid is palpable and normal in size without any nodules. No anterior cervical or posterior cervical adenopathy.    Heart: Regular rate and rhythm, no clicks or gallops, 3 out of 6 systolic murmur heard best at the right upper sternal border    Lungs: Clear to auscultation bilaterally without any rhonchi rales or wheezing, lung sounds heard throughout all lung fields    Abdomen: Soft, nontender, no rigidity, rebound, guarding or organomegaly. Bowel sounds ×4 quadrants.    Musculoskeletal: Normal range of motion, strength is grossly intact in the proximal distal muscles of the upper and lower extremities bilaterally, deep tendon reflexes +2 out of 4 and symmetric bilaterally at the patella, Achilles, biceps, triceps, sensation intact.    Nerves: Cranial nerves II through XII appear grossly intact and without deficit    Skin: Intact, dry, no rashes or erythema    Psych: Normal affect.    Assessment/Plan   Problem List Items Addressed This Visit       Occipital neuralgia of left side    Relevant Medications    morphine CR (MS Contin) 15 mg 12 hr tablet    Postherpetic neuralgia    Relevant Medications    morphine CR (MS Contin) 15 mg 12 hr tablet    lidocaine (Lidoderm) 5 % patch     Other Visit Diagnoses         Colon cancer screening    -  Primary    Relevant Orders    Cologuard® colon cancer screening            Postherpetic neuralgia  Left-sided head.  Followed with pain management.  On MS Contin and gabapentin.  - Given Shingrix vaccination   - Continue current regimen  - Urine drug screen  ordered 1/15/25  - Prescription agreement 1/15/25  - OARRS reviewed and no abberant behavior.   - Lidocaine patches   - Capcasian cream     Bicuspid aortic valve and enlarging ascending aorta  - Following with Dr. Conner's cardiology  - Patient did have hyperlipidemia and stated to discuss with Dr. Conner starting statin.    Prostate cancer screening was done January 2024.  Patient has no symptoms.  PSA was normal 0.53    Colon cancer screening needs to be done and patient has Cologuard that he will do at home.  There is no family history of colon cancer. He will do at home    Patient has vitamin B-12 deficiency  - vitamin B12 sublingual    Vitamin D deficiency  - vitamin D 50,000 IUs weekly with food     Possible Marfans  - has enlarged aorta and history of spontaneous pneumothorax.

## 2025-04-17 ENCOUNTER — PHARMACY VISIT (OUTPATIENT)
Dept: PHARMACY | Facility: CLINIC | Age: 55
End: 2025-04-17
Payer: COMMERCIAL

## 2025-05-08 DIAGNOSIS — M54.81 OCCIPITAL NEURALGIA OF LEFT SIDE: ICD-10-CM

## 2025-05-08 DIAGNOSIS — B02.29 POSTHERPETIC NEURALGIA: ICD-10-CM

## 2025-05-08 RX ORDER — MORPHINE SULFATE 15 MG/1
15 TABLET, FILM COATED, EXTENDED RELEASE ORAL 2 TIMES DAILY
Qty: 60 TABLET | Refills: 0 | Status: SHIPPED | OUTPATIENT
Start: 2025-05-08 | End: 2025-06-07

## 2025-05-15 ENCOUNTER — PHARMACY VISIT (OUTPATIENT)
Dept: PHARMACY | Facility: CLINIC | Age: 55
End: 2025-05-15
Payer: COMMERCIAL

## 2025-05-15 PROCEDURE — RXMED WILLOW AMBULATORY MEDICATION CHARGE

## 2025-06-09 DIAGNOSIS — B02.29 POSTHERPETIC NEURALGIA: ICD-10-CM

## 2025-06-09 DIAGNOSIS — M54.81 OCCIPITAL NEURALGIA OF LEFT SIDE: ICD-10-CM

## 2025-06-10 RX ORDER — MORPHINE SULFATE 15 MG/1
15 TABLET, FILM COATED, EXTENDED RELEASE ORAL 2 TIMES DAILY
Qty: 60 TABLET | Refills: 0 | Status: SHIPPED | OUTPATIENT
Start: 2025-06-10 | End: 2025-07-12

## 2025-06-12 ENCOUNTER — PHARMACY VISIT (OUTPATIENT)
Dept: PHARMACY | Facility: CLINIC | Age: 55
End: 2025-06-12
Payer: COMMERCIAL

## 2025-06-12 DIAGNOSIS — B02.29 POSTHERPETIC NEURALGIA: ICD-10-CM

## 2025-06-12 DIAGNOSIS — M54.81 OCCIPITAL NEURALGIA OF LEFT SIDE: ICD-10-CM

## 2025-06-12 PROCEDURE — RXMED WILLOW AMBULATORY MEDICATION CHARGE

## 2025-06-13 RX ORDER — GABAPENTIN 400 MG/1
1200 CAPSULE ORAL 3 TIMES DAILY
Qty: 810 CAPSULE | Refills: 3 | Status: SHIPPED | OUTPATIENT
Start: 2025-06-13 | End: 2026-06-13

## 2025-06-17 PROCEDURE — RXMED WILLOW AMBULATORY MEDICATION CHARGE

## 2025-06-18 ENCOUNTER — PHARMACY VISIT (OUTPATIENT)
Dept: PHARMACY | Facility: CLINIC | Age: 55
End: 2025-06-18
Payer: COMMERCIAL

## 2025-07-16 ENCOUNTER — APPOINTMENT (OUTPATIENT)
Dept: PRIMARY CARE | Facility: CLINIC | Age: 55
End: 2025-07-16
Payer: COMMERCIAL

## 2025-07-16 VITALS
HEIGHT: 75 IN | WEIGHT: 189 LBS | DIASTOLIC BLOOD PRESSURE: 68 MMHG | RESPIRATION RATE: 18 BRPM | OXYGEN SATURATION: 96 % | BODY MASS INDEX: 23.5 KG/M2 | HEART RATE: 84 BPM | SYSTOLIC BLOOD PRESSURE: 118 MMHG

## 2025-07-16 DIAGNOSIS — B02.29 POSTHERPETIC NEURALGIA: ICD-10-CM

## 2025-07-16 DIAGNOSIS — M54.81 OCCIPITAL NEURALGIA OF LEFT SIDE: Primary | ICD-10-CM

## 2025-07-16 DIAGNOSIS — Z12.11 ENCOUNTER FOR SCREENING FOR MALIGNANT NEOPLASM OF COLON: ICD-10-CM

## 2025-07-16 DIAGNOSIS — E53.8 VITAMIN B 12 DEFICIENCY: ICD-10-CM

## 2025-07-16 PROCEDURE — 3008F BODY MASS INDEX DOCD: CPT | Performed by: FAMILY MEDICINE

## 2025-07-16 PROCEDURE — 99214 OFFICE O/P EST MOD 30 MIN: CPT | Performed by: FAMILY MEDICINE

## 2025-07-16 RX ORDER — LANOLIN ALCOHOL/MO/W.PET/CERES
1000 CREAM (GRAM) TOPICAL DAILY
Qty: 90 TABLET | Refills: 3 | Status: SHIPPED | OUTPATIENT
Start: 2025-07-16

## 2025-07-16 RX ORDER — MORPHINE SULFATE 15 MG/1
15 TABLET, FILM COATED, EXTENDED RELEASE ORAL 2 TIMES DAILY
Qty: 60 TABLET | Refills: 0 | Status: SHIPPED | OUTPATIENT
Start: 2025-07-16 | End: 2025-08-15

## 2025-07-16 ASSESSMENT — PATIENT HEALTH QUESTIONNAIRE - PHQ9
2. FEELING DOWN, DEPRESSED OR HOPELESS: NOT AT ALL
SUM OF ALL RESPONSES TO PHQ9 QUESTIONS 1 AND 2: 0
1. LITTLE INTEREST OR PLEASURE IN DOING THINGS: NOT AT ALL

## 2025-07-16 NOTE — PROGRESS NOTES
Subjective   Patient ID: Sebastian Andrews is a 55 y.o. male who presents for post herpetic neuralgia and pain medications    History of Present Illness  The patient presents for evaluation of left-sided scalp pain.    She reports no change in her pain levels, which remain at a 5 out of 10 with medication and escalate to 8 or 9 without it. The pain is described as throbbing and is localized to the left side of her scalp. She mentions that even minor contact, such as bumping her head on a curtain, exacerbates the pain. The pain is somewhat alleviated when she lies down on a cold pillow, and she typically sleeps on her right side due to the discomfort. She wakes up at 5:30 AM and notes that the pain worsens after a long interval without medication.    She has not tried acupuncture as a treatment option. She does not experience any side effects from her medications, including constipation, and has regular bowel movements. She has not started any new medications recently. She has been on gabapentin for several years, taking 9 tablets daily, and expresses concern about potential kidney damage from long-term use. Additionally, she takes vitamin B12 supplements.    She has undergone nerve blocks and an epidural injection in the past, but these did not provide relief. A previous treatment involving steroids and another substance provided temporary relief until the numbing effect subsided. She was previously on morphine three times a day but requested to switch to extended-release morphine twice a day.    Her pain is exacerbated by heat and sweat but is somewhat alleviated when she lies down on a cold pillow. She experiences itching and burning sensations on her scalp when showering, particularly when water hits certain spots. A pain management specialist suggested an electric stimulator implant, but she declined due to her outdoor work environment.    Social History:  Occupations: Works outdoors  Coffee/Tea/Caffeine-containing  Drinks: Drinks coffee  Sleep: Wakes up at 5:30 AM, typically sleeps on her right side    PAST SURGICAL HISTORY:  - Nerve blocks  - Epidural injection  - Steroid treatment     Results       No visits with results within 1 Month(s) from this visit.   Latest known visit with results is:   Office Visit on 03/27/2025   Component Date Value    Ventricular Rate 03/27/2025 63     Atrial Rate 03/27/2025 63     [place] Interval 03/27/2025 158     QRS Duration 03/27/2025 88     QT Interval 03/27/2025 398     QTC Calculation(Bazett) 03/27/2025 407     P Axis 03/27/2025 58     R [place] 03/27/2025 29     T [place] 03/27/2025 31     QRS Count 03/27/2025 10     Q Onset 03/27/2025 218     P Onset 03/27/2025 139     P Offset 03/27/2025 204     T Offset 03/27/2025 417     QTC [place] 03/27/2025 404       Medication cuts it in half 5/10 and is 8/10 without medications.  Pain is on the scalp left side post herpetic neuralgia  Had seen neurology and received steroid injections without relief.    Saw Pain Medicine and they are currently using MS contin 15 mg  q 12 hours and gabapentin 1200 mg 3 x day    OARRS:  Addison Tovar, DO on 7/15/2025  9:32 PM  I have personally reviewed the OARRS report for Sebastian Andrews. I have considered the risks of abuse, dependence, addiction and diversion    Is the patient prescribed a combination of a benzodiazepine and opioid?  Yes, I feel it is clincially indicated to continue the medication and have discussed with the patient risks/benefits/alternatives.    Last Urine Drug Screen / ordered today: Yes  Recent Results (from the past 8760 hours)   Confirmation Opiate/Opioid/Benzo Prescription Compliance    Collection Time: 01/15/25 10:04 AM   Result Value Ref Range    Clonazepam <25 <25 ng/mL    7-Aminoclonazepam <25 <25 ng/mL    Alprazolam <25 <25 ng/mL    Alpha-Hydroxyalprazolam <25 <25 ng/mL    Midazolam <25 <25 ng/mL    Alpha-Hydroxymidazolam <25 <25 ng/mL    Chlordiazepoxide <25 <25 ng/mL    Diazepam <25 <25  ng/mL    Nordiazepam <25 <25 ng/mL    Temazepam <25 <25 ng/mL    Oxazepam <25 <25 ng/mL    Lorazepam <25 <25 ng/mL    Methadone <25 <25 ng/mL    EDDP <25 <25 ng/mL    6-Acetylmorphine <25 <25 ng/mL    Codeine <50 <50 ng/mL    Hydrocodone <25 <25 ng/mL    Hydromorphone 271 (H) <25 ng/mL    Morphine  >2,500 (H) <50 ng/mL    Norhydrocodone <25 <25 ng/mL    Noroxycodone <25 <25 ng/mL    Oxycodone <25 <25 ng/mL    Oxymorphone <25 <25 ng/mL    Fentanyl <2.5 <2.5 ng/mL    Norfentanyl <2.5 <2.5 ng/mL    Tramadol <50 <50 ng/mL    O-Desmethyltramadol <50 <50 ng/mL    Zolpidem <25 <25 ng/mL    Zolpidem Metabolite (ZCA) <25 <25 ng/mL   Screen Opiate/Opioid/Benzo Prescription Compliance    Collection Time: 01/15/25 10:04 AM   Result Value Ref Range    Creatinine, Urine Random 148.2 20.0 - 370.0 mg/dL    Amphetamine Screen, Urine Presumptive Negative Presumptive Negative    Barbiturate Screen, Urine Presumptive Negative Presumptive Negative    Cannabinoid Screen, Urine Presumptive Negative Presumptive Negative    Cocaine Metabolite Screen, Urine Presumptive Negative Presumptive Negative    PCP Screen, Urine Presumptive Negative Presumptive Negative     N/A        Controlled Substance Agreement:  Date of the Last Agreement: 1/15/24  Reviewed Controlled Substance Agreement including but not limited to the benefits, risks, and alternatives to treatment with a Controlled Substance medication(s).    Opioids:  What is the patient's goal of therapy? Treat postherpetic neuralgia  Is this being achieved with current treatment? Yes.  Has been seeing pain medicine    I have calculated the patient's Morphine Dose Equivalent (MED):   I have considered referral to Pain Management and/or a specialist, and do not feel it is necessary at this time.    I feel that it is clinically indicated to continue this current medication regimen after consideration of alternative therapies, and other non-opioid treatment.    Opioid Risk  Screening:  Low    Pain Assessment:  8/10 with medication and 5/10 with medication    Physically better, mental is same, Social better, sleep better on the medication    Overall benefit.   5 in the Am woken by the pain    Review of Systems   All other systems reviewed and are negative.        Objective   There were no vitals taken for this visit.    General: Patient is alert and oriented ×3 and appears in no acute distress. No respiratory distress.    Head: Atraumatic normocephalic.    Eyes: EOMI, PERRLA      Ears: Canals patent without any irritation, tympanic membranes without inflammation, no swelling, normal light reflex.    Nose: Nares patent. Turbinates are not swollen. No discharge.    Mouth: Normal mucosa. Moist. No erythema, exudates, tonsillar enlargement.    Neck: Normal range of motion, no masses.  Thyroid is palpable and normal in size without any nodules. No anterior cervical or posterior cervical adenopathy.    Heart: Regular rate and rhythm, no clicks or gallops, 3 out of 6 systolic murmur heard best at the right upper sternal border    Lungs: Clear to auscultation bilaterally without any rhonchi rales or wheezing, lung sounds heard throughout all lung fields    Abdomen: Soft, nontender, no rigidity, rebound, guarding or organomegaly. Bowel sounds ×4 quadrants.    Musculoskeletal: Normal range of motion, strength is grossly intact in the proximal distal muscles of the upper and lower extremities bilaterally, deep tendon reflexes +2 out of 4 and symmetric bilaterally at the patella, Achilles, biceps, triceps, sensation intact.    Nerves: Cranial nerves II through XII appear grossly intact and without deficit    Skin: Intact, dry, no rashes or erythema    Psych: Normal affect.    Assessment/Plan   Problem List Items Addressed This Visit       Occipital neuralgia of left side - Primary    Postherpetic neuralgia     Assessment & Plan  1. Left-sided scalp pain.  - The patient's left-sided scalp pain  persists despite previous treatments, including nerve blocks and epidural injections. The pain is described as throbbing and is exacerbated by light touch, heat, and sweating. It is somewhat relieved by cool compresses.  - Physical exam findings indicate no complications or side effects from current medications. The patient reports no issues with constipation and has regular bowel movements.  - Acupuncture was recommended as a potential treatment option. Homeopathic [place] 30C was suggested for nerve pain relief, with instructions to dissolve the pellets under the tongue several times a day for a few weeks. If effective, she should wait until the pain returns before taking the next dose.  - A cooling pillow was also recommended to improve sleep quality. Refills for gabapentin and morphine were provided.         Postherpetic neuralgia  Left-sided head.  Followed with pain management.  On MS Contin and gabapentin.  - Given Shingrix vaccination   - Continue current regimen  - Urine drug screen ordered 1/15/25  - Prescription agreement 1/15/25  - OARRS reviewed and no abberant behavior.   - Lidocaine patches   - Capcasian cream     Bicuspid aortic valve and enlarging ascending aorta  - Following with Dr. Conner's cardiology  - Patient did have hyperlipidemia and stated to discuss with Dr. Conner starting statin.    Prostate cancer screening was done January 2024.  Patient has no symptoms.  PSA was normal 0.53    Colon cancer screening needs to be done and patient has Cologuard that he will do at home.  There is no family history of colon cancer. He will do at home    Patient has vitamin B-12 deficiency  - vitamin B12 sublingual    Vitamin D deficiency  - vitamin D 50,000 IUs weekly with food     Possible Marfans  - has enlarged aorta and history of spontaneous pneumothorax.

## 2025-08-13 DIAGNOSIS — M54.81 OCCIPITAL NEURALGIA OF LEFT SIDE: ICD-10-CM

## 2025-08-13 DIAGNOSIS — B02.29 POSTHERPETIC NEURALGIA: ICD-10-CM

## 2025-08-14 RX ORDER — MORPHINE SULFATE 15 MG/1
15 TABLET, FILM COATED, EXTENDED RELEASE ORAL 2 TIMES DAILY
Qty: 60 TABLET | Refills: 0 | Status: SHIPPED | OUTPATIENT
Start: 2025-08-14 | End: 2025-09-13

## 2025-10-15 ENCOUNTER — APPOINTMENT (OUTPATIENT)
Dept: PRIMARY CARE | Facility: CLINIC | Age: 55
End: 2025-10-15
Payer: COMMERCIAL